# Patient Record
Sex: MALE | Employment: OTHER | URBAN - METROPOLITAN AREA
[De-identification: names, ages, dates, MRNs, and addresses within clinical notes are randomized per-mention and may not be internally consistent; named-entity substitution may affect disease eponyms.]

---

## 2017-11-20 ENCOUNTER — OFFICE VISIT (OUTPATIENT)
Dept: SURGERY | Facility: CLINIC | Age: 52
End: 2017-11-20
Payer: COMMERCIAL

## 2017-11-20 VITALS
TEMPERATURE: 99 F | HEIGHT: 74 IN | DIASTOLIC BLOOD PRESSURE: 81 MMHG | HEART RATE: 68 BPM | WEIGHT: 173 LBS | SYSTOLIC BLOOD PRESSURE: 122 MMHG | BODY MASS INDEX: 22.2 KG/M2

## 2017-11-20 DIAGNOSIS — K40.90 RIGHT INGUINAL HERNIA: Primary | ICD-10-CM

## 2017-11-20 DIAGNOSIS — K40.90 LEFT INGUINAL HERNIA: ICD-10-CM

## 2017-11-20 PROCEDURE — 99203 OFFICE O/P NEW LOW 30 MIN: CPT | Performed by: SURGERY

## 2017-11-20 NOTE — NURSING NOTE
"Chief Complaint   Patient presents with     Consult       Initial /81  Pulse 68  Temp 99  F (37.2  C) (Oral)  Ht 6' 2\" (1.88 m)  Wt 173 lb (78.5 kg)  BMI 22.21 kg/m2 Estimated body mass index is 22.21 kg/(m^2) as calculated from the following:    Height as of this encounter: 6' 2\" (1.88 m).    Weight as of this encounter: 173 lb (78.5 kg).  Medication Reconciliation: complete  "

## 2017-11-21 NOTE — PROGRESS NOTES
HPI:  Kwasi is a 52 year old male who presents for evaluation of right groin bulge.  This is a fairly recent development.  He has had relatively minimal symptoms on that side.  He denies any symptoms on the left side or in the umbilical region.  The patient travels a great deal for work, but does not have to do much lifting.    Past Medical History:  Cataracts  Retinal detachment    Past Surgical History:  Cataracts       Social History:  Social History     Social History     Marital status:      Spouse name: N/A     Number of children: N/A     Years of education: N/A     Occupational History     Not on file.     Social History Main Topics     Smoking status: Not on file     Smokeless tobacco: Not on file     Alcohol use Not on file     Drug use: Not on file     Sexual activity: Not on file     Other Topics Concern     Not on file     Social History Narrative        Family History:  No family history on file.      ROS:  The 10 point review of systems is negative other than noted in the HPI and above.    PE:    General- Well-developed, well-nourished, patient able to get up on table without difficulty.  HEENT- Normocephalic and atraumatic. Pupils equal and round.  Mucous membranes moist.  Sclera are nonicteric.  Neck- No lymphadenopathy or masses   Respirations- are regular and non labored  Abdomen is abdomen is soft without significant tenderness, masses, organomegaly or guarding  Hernia- there is a small left inguinal hernia palpable.  This is easily reducible              Right inguinal hernia is present with valsalva              The hernia is reducible   There is a tiny umbilical hernia of questionable clinical significance.              External genitalia are normal               Assessment: Right inguinal hernia with small left inguinal hernia.    Plan:  I have recommended robotic repair of the patient's right inguinal hernia and probable repair of left inguinal hernia as well.  We would assess this to  be sure that it was a true hernia during the surgery and repair this if appropriate.  We have discussed observation, reduction techniques and importance, incarceration and strangulation signs, symptoms and importance as well as need to seek emergency treatment.    We have discussed surgery in detail, including risk, benefits, complications, mesh, infection, nerve and cord damage and their sequelae including chronic pain and testicular loss, lifting and activity limits after surgery. He has been given literature to review. We will schedule surgery at patient's convenience.      Shaheen Kee MD    Please route or send letter to:  *None*

## 2017-12-04 ENCOUNTER — OFFICE VISIT (OUTPATIENT)
Dept: INTERNAL MEDICINE | Facility: CLINIC | Age: 52
End: 2017-12-04
Payer: COMMERCIAL

## 2017-12-04 VITALS — WEIGHT: 172.3 LBS | HEIGHT: 74 IN | TEMPERATURE: 98.5 F | BODY MASS INDEX: 22.11 KG/M2 | OXYGEN SATURATION: 98 %

## 2017-12-04 DIAGNOSIS — Z23 NEED FOR TDAP VACCINATION: ICD-10-CM

## 2017-12-04 DIAGNOSIS — D72.819 LEUKOPENIA, UNSPECIFIED TYPE: ICD-10-CM

## 2017-12-04 DIAGNOSIS — Z12.5 SCREENING FOR PROSTATE CANCER: ICD-10-CM

## 2017-12-04 DIAGNOSIS — Z00.00 ROUTINE GENERAL MEDICAL EXAMINATION AT A HEALTH CARE FACILITY: Primary | ICD-10-CM

## 2017-12-04 LAB
ALBUMIN SERPL-MCNC: 4.1 G/DL (ref 3.4–5)
ALP SERPL-CCNC: 57 U/L (ref 40–150)
ALT SERPL W P-5'-P-CCNC: 13 U/L (ref 0–70)
ANION GAP SERPL CALCULATED.3IONS-SCNC: 11 MMOL/L (ref 3–14)
AST SERPL W P-5'-P-CCNC: 14 U/L (ref 0–45)
BASOPHILS # BLD AUTO: 0 10E9/L (ref 0–0.2)
BASOPHILS NFR BLD AUTO: 0.2 %
BILIRUB SERPL-MCNC: 0.4 MG/DL (ref 0.2–1.3)
BUN SERPL-MCNC: 19 MG/DL (ref 7–30)
CALCIUM SERPL-MCNC: 9.3 MG/DL (ref 8.5–10.1)
CHLORIDE SERPL-SCNC: 107 MMOL/L (ref 94–109)
CHOLEST SERPL-MCNC: 239 MG/DL
CO2 SERPL-SCNC: 24 MMOL/L (ref 20–32)
CREAT SERPL-MCNC: 1.07 MG/DL (ref 0.66–1.25)
DIFFERENTIAL METHOD BLD: NORMAL
EOSINOPHIL # BLD AUTO: 0.2 10E9/L (ref 0–0.7)
EOSINOPHIL NFR BLD AUTO: 3.8 %
ERYTHROCYTE [DISTWIDTH] IN BLOOD BY AUTOMATED COUNT: 12.2 % (ref 10–15)
GFR SERPL CREATININE-BSD FRML MDRD: 72 ML/MIN/1.7M2
GLUCOSE SERPL-MCNC: 97 MG/DL (ref 70–99)
HCT VFR BLD AUTO: 43.3 % (ref 40–53)
HDLC SERPL-MCNC: 54 MG/DL
HGB BLD-MCNC: 14.4 G/DL (ref 13.3–17.7)
LDLC SERPL CALC-MCNC: 166 MG/DL
LYMPHOCYTES # BLD AUTO: 1.4 10E9/L (ref 0.8–5.3)
LYMPHOCYTES NFR BLD AUTO: 24.8 %
MCH RBC QN AUTO: 31.9 PG (ref 26.5–33)
MCHC RBC AUTO-ENTMCNC: 33.3 G/DL (ref 31.5–36.5)
MCV RBC AUTO: 96 FL (ref 78–100)
MONOCYTES # BLD AUTO: 0.4 10E9/L (ref 0–1.3)
MONOCYTES NFR BLD AUTO: 7.4 %
NEUTROPHILS # BLD AUTO: 3.6 10E9/L (ref 1.6–8.3)
NEUTROPHILS NFR BLD AUTO: 63.8 %
NONHDLC SERPL-MCNC: 185 MG/DL
PLATELET # BLD AUTO: 210 10E9/L (ref 150–450)
POTASSIUM SERPL-SCNC: 4.2 MMOL/L (ref 3.4–5.3)
PROT SERPL-MCNC: 7.4 G/DL (ref 6.8–8.8)
PSA SERPL-ACNC: 0.69 UG/L (ref 0–4)
RBC # BLD AUTO: 4.52 10E12/L (ref 4.4–5.9)
SODIUM SERPL-SCNC: 142 MMOL/L (ref 133–144)
TRIGL SERPL-MCNC: 94 MG/DL
TSH SERPL DL<=0.005 MIU/L-ACNC: 1.28 MU/L (ref 0.4–4)
WBC # BLD AUTO: 5.6 10E9/L (ref 4–11)

## 2017-12-04 PROCEDURE — 99386 PREV VISIT NEW AGE 40-64: CPT | Mod: 25 | Performed by: INTERNAL MEDICINE

## 2017-12-04 PROCEDURE — 80050 GENERAL HEALTH PANEL: CPT | Performed by: INTERNAL MEDICINE

## 2017-12-04 PROCEDURE — 36415 COLL VENOUS BLD VENIPUNCTURE: CPT | Performed by: INTERNAL MEDICINE

## 2017-12-04 PROCEDURE — 80061 LIPID PANEL: CPT | Performed by: INTERNAL MEDICINE

## 2017-12-04 PROCEDURE — G0103 PSA SCREENING: HCPCS | Performed by: INTERNAL MEDICINE

## 2017-12-04 PROCEDURE — 90471 IMMUNIZATION ADMIN: CPT | Performed by: INTERNAL MEDICINE

## 2017-12-04 PROCEDURE — 90715 TDAP VACCINE 7 YRS/> IM: CPT | Performed by: INTERNAL MEDICINE

## 2017-12-04 NOTE — PROGRESS NOTES
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  Comment:   Plan: CBC with platelets differential            (Z12.5) Screening for prostate cancer  Comment:   Plan: PSA, screen               Chief Complaint:                                                      Annual exam    SUBJECTIVE:                                                    History of present illness     Patient reports normal colonoscopy done at age 49       ROS:   General: Negative for fever, chills, major weight changes, fatigue  Skin: Negative for rashes, abnormal spots  Eyes: Negative for blurred or double vision  ENT/mouth: Negative for sinuses discomfort, earache, sore throat  Respiratory: Negative for cough, wheezes, chronic lung disease  Cardiovascular: Negative for rest or exertional chest pain, shortness of breath, palpitations, leg edema,   Gastrointestinal: Negative for vomiting, abdominal pain, heartburn, blood in stool, diarrhea, constipation  Genitourinary: Negative for urinary frequency, blood in urine, history of kidney stones  Male: Negative for difficulty urinating  Neuro: Negative for headaches, numbness, tingling, weakness in arms or legs, history of seizure, recent syncope  Psychiatry: Negative for depression, anxiety, suicidal thoughts  Endo: Negative for known thyroid disease, diabetes.  Hemato/Lymph: Negative for nodes, easy bleeding, history of DVT, blood transfusion  Musculoskeletal: Negative for joint swelling, back pain    Past Medical History:   Diagnosis Date     Kidney calculus 2007     Uveitis 2010        Past  "Medical History:   Diagnosis Date     Kidney calculus 2007     Uveitis 2010      Soc Hx: No daily alcohol, no smoking  Social History     Social History     Marital status:      Spouse name: N/A     Number of children: N/A     Years of education: N/A     Occupational History     Not on file.     Social History Main Topics     Smoking status: Never Smoker     Smokeless tobacco: Never Used     Alcohol use Yes      Comment: occasionally     Drug use: No     Sexual activity: Yes     Partners: Female     Other Topics Concern     Not on file     Social History Narrative     No narrative on file        Fam Hx: reviewed  Family History   Problem Relation Age of Onset     Family History Negative Mother      Family History Negative Father      Family History Negative Maternal Grandmother      DIABETES Maternal Grandfather      Adult onset     Hyperlipidemia Maternal Grandfather      Family History Negative Paternal Grandmother      Family History Negative Paternal Grandfather      Breast Cancer Paternal Aunt 62         Screening: reviewed    All: reviewed    Meds: reviewed  No current outpatient prescriptions on file.           OBJECTIVE:                                                    Physical Exam :      Temperature 98.5  F (36.9  C), temperature source Oral, height 6' 2\" (1.88 m), weight 172 lb 4.8 oz (78.2 kg), SpO2 98 %.   NAD, appears comfortable  Skin clear, no rashes  HEENT: PERRLA, EOMI, anicteric sclera, pink conjunctiva, external ears appear normal, bilateral tympanic membranes clinically normal, oropharynx normal color.  Neck: supple, no JVD,  no thyroidmegaly  Lymph nodes non palpable in the cervical, supraclavicular axillaries, inguinal areas  Chest: clear to auscultation with good respiratory effort  Cardiac: S1S2, RRR, no mgr appreciated  Abdomen: soft, not tender, not distended, audible bowel sound, no hepatosplenomegaly, no palpable masses, no abdominal bruits  Extremities: no cyanosis, clubbing or " edema.   Neuro: A, Ox3, no focal signs.  Breast exam no gynecomastia, no masses  Genital exam performed with the nurse in the room: normal external genitals, no testicular masses. Rectal exam: normal anal sphincter tonus, no masses in the rectal vault, prostate in normal limits.      Sophie Ha MD  Internal Medicine      SUBJECTIVE:   CC: Kwasi Mcelroy is an 52 year old male who presents for preventative health visit.     *Has not been to PCP in 2-3 years since moving from [place]. Only health issues he really has is eye issues  *Was on corticosteriods for about 2011 to 2013 (was on 60-70 mg/day and tapered down).     Healthy Habits:    Do you get at least three servings of calcium containing foods daily (dairy, green leafy vegetables, etc.)? yes    Amount of exercise or daily activities, outside of work: 4-5 day(s) per week    Problems taking medications regularly No    Medication side effects: No    Have you had an eye exam in the past two years? yes    Do you see a dentist twice per year? yes    Do you have sleep apnea, excessive snoring or daytime drowsiness?yes      Today's PHQ-2 Score: No flowsheet data found.      Abuse: Current or Past(Physical, Sexual or Emotional)- No  Do you feel safe in your environment - Yes  Social History   Substance Use Topics     Smoking status: Never Smoker     Smokeless tobacco: Never Used     Alcohol use Yes      Comment: occasionally     The patient does not drink >3 drinks per day nor >7 drinks per week.    Last PSA: No results found for: PSA    Reviewed orders with patient. Reviewed health maintenance and updated orders accordingly - Yes      Reviewed and updated as needed this visit by clinical staff  Tobacco  Allergies  Meds  Med Hx  Surg Hx  Fam Hx  Soc Hx        Reviewed and updated as needed this visit by Provider            COUNSELING:  Reviewed preventive health counseling, as reflected in patient instructions       Regular exercise       Healthy  "diet/nutrition           reports that he has never smoked. He has never used smokeless tobacco.      Estimated body mass index is 22.12 kg/(m^2) as calculated from the following:    Height as of this encounter: 6' 2\" (1.88 m).    Weight as of this encounter: 172 lb 4.8 oz (78.2 kg).         Counseling Resources:  ATP IV Guidelines  Pooled Cohorts Equation Calculator  FRAX Risk Assessment  ICSI Preventive Guidelines  Dietary Guidelines for Americans, 2010  USDA's MyPlate  ASA Prophylaxis  Lung CA Screening    Sophie Adler MD  [facility]  "

## 2017-12-04 NOTE — MR AVS SNAPSHOT
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   o   Discuss preventive care.    o   Review your medicines if your doctor has prescribed any.     Have a cholesterol test every 5 years, or more frequently if you are at risk for high cholesterol/heart disease.     Have a diabetes test (fasting glucose) every three years. If you are at risk for diabetes, you should have this test more often.     Have a colonoscopy at age 50, or have a yearly FIT test (stool test). These exams will check for colon cancer.      Talk with your health care provider about whether or not a prostate cancer screening test (PSA) is right for you.    You should be tested each year for STDs (sexually transmitted diseases), if you re at risk.     Shots: Get a flu shot each year. Get a tetanus shot every 10 years.     Nutrition:    Eat at least 5 servings of fruits and vegetables daily.     Eat whole-grain bread, whole-wheat pasta and brown rice instead of white grains and rice.     Talk to your provider about Calcium and Vitamin D.     Lifestyle    Exercise for at least 150 minutes a week (30 minutes a day, 5 days a week). This will help you control your weight and prevent disease.     Limit alcohol to one drink per day.     No smoking.     Wear sunscreen to prevent skin cancer.     See your dentist every six  "months for an exam and cleaning.     See your eye doctor every 1 to 2 years.            Follow-ups after your visit        Your next 10 appointments already scheduled     Jan 26, 2018   Procedure with Shaheen Kee MD   [facility] (--)    [address]  [facility] [address]   [phone]            Jan 26, 2018 12:00 PM CST   [facility] Same Day Surgery with Shaheen Kee MD, Breonna Ordoñez PA-C   [facility] ([facility])    [facility] ([facility])   [phone]              Who to contact     If you have questions or need follow up information about today's clinic visit or your schedule please contact [facility] directly at [phone].  Normal or non-critical lab and imaging results will be communicated to you by Mister Bellhart, letter or phone within 4 business days after the clinic has received the results. If you do not hear from us within 7 days, please contact the clinic through Mister Bellhart or phone. If you have a critical or abnormal lab result, we will notify you by phone as soon as possible.  Submit refill requests through Perfect or call your pharmacy and they will forward the refill request to us. Please allow 3 business days for your refill to be completed.          Additional Information About Your Visit        Mister BellharTrippeo Information     Perfect lets you send messages to your doctor, view your test results, renew your prescriptions, schedule appointments and more. To sign up, go to [email or web address] . Click on \"Log in\" on the left side of the screen, which will take you to the Welcome page. Then click on \"Sign up Now\" on the right side of the page.     You will be asked to enter the access code listed below, as well as some personal information. Please follow the directions to create your username and password.     Your access code is: " [email or web address]  Expires: 2018  6:18 PM     Your access code will  in 90 days. If you need help or a new code, please call your [place] clinic or [phone].        Care EveryWhere ID     This is your Care EveryWhere ID. This could be used by other organizations to access your [place] medical records  [id number]        Your Vitals Were     Temperature Height Pulse Oximetry BMI (Body Mass Index)          98.5  F (36.9  C) (Oral) 6' 2\" (1.88 m) 98% 22.12 kg/m2         Blood Pressure from Last 3 Encounters:   17 122/81    Weight from Last 3 Encounters:   17 172 lb 4.8 oz (78.2 kg)   17 173 lb (78.5 kg)              We Performed the Following     CBC with platelets differential     Comprehensive metabolic panel     Lipid panel reflex to direct LDL Fasting     PSA, screen     TDAP VACCINE (ADACEL)     TSH with free T4 reflex        Primary Care Provider Fax #    Physician No Ref-Primary [phone]       No address on file        Equal Access to Services     [facility]: Hadii aad ku hadasho Sovandana, waaxda luqadaha, qaybta kaalmada adeegyadewayne, shell roman . So [facility] [phone].    ATENCIÓN: Si habla español, tiene a jarvis disposición servicios gratuitos de asistencia lingüística. Llame al [phone].    We comply with applicable federal civil rights laws and [place] laws. We do not discriminate on the basis of race, color, national origin, age, disability, sex, sexual orientation, or gender identity.            Thank you!     Thank you for choosing [facility]  for your care. Our goal is always to provide you with excellent care. Hearing back from our patients is one way we can continue to improve our services. Please take a few minutes to complete the written survey that you may receive in the mail after your visit with us. Thank you!             Your Updated Medication List - Protect others around you: Learn how to safely use, store and " throw away your medicines at www.disposemymeds.org.      Notice  As of 12/4/2017  4:11 PM    You have not been prescribed any medications.

## 2017-12-04 NOTE — LETTER
[facility]  [address], [address]  [place] [address]  [phone]        December 9, 2017    [facility]elroy  [facility] [facility] [address]            Dear Mr. Kwasi Mcelroy:    The recent blood tests results are in acceptable limits, but high cholesterol.  Diet and exercise should help with the numbers.  Please make a lab appointment for fasting labs in 6 months.  Please make an appointment few days after the labs to discuss about the results and possible meds.   Your risk for stroke and heart disease is 5% over 10 years    Sincerely,    Sophie Ha MD  Internal Medicine    These are some general explanations for tests  WBC means White Blood Cells  Platelets are small blood cells that help with forming the blood clots along with other blood factors.  Electrolytes are Sodium, Potassium, Calcium, Magnesium, Phosphorus.  Liver tests are: AST, ALT, Bilirubin, Alkaline Phosphatase.  Kidney tests are Creatinine, GFR.  HDL Cholesterol - is the good cholesterol and it is good to have it high.  LDL cholesterol is the bad cholesterol and it is good to have it low.  It is recommended to have LDL less than 130 for people with hypertension and to have it less than 100 for people with heart disease, diabetes and chronic kidney disease.  Thyroid tests are TSH, T4, T3  A1c is a test that gives us an idea about how well was controlled the diabetes for the last 3 months.   PSA stands for Prostate Specific Antigen and it can be elevated with prostate cancer or prostate inflammation.        The 10-year ASCVD risk score ([place] Beachdarin CONNOLLY Jr, et al., 2013) is: 5%    Values used to calculate the score:      Age: 52 years      Sex: Male      Is Non- : No      Diabetic: No      Tobacco smoker: No      Systolic Blood Pressure: 122 mmHg      Is BP treated: No      HDL Cholesterol: 54 mg/dL      Total Cholesterol: 239 mg/dL

## 2018-01-18 ENCOUNTER — OFFICE VISIT (OUTPATIENT)
Dept: INTERNAL MEDICINE | Facility: CLINIC | Age: 53
End: 2018-01-18
Payer: COMMERCIAL

## 2018-01-18 VITALS
HEIGHT: 74 IN | DIASTOLIC BLOOD PRESSURE: 70 MMHG | OXYGEN SATURATION: 97 % | SYSTOLIC BLOOD PRESSURE: 122 MMHG | BODY MASS INDEX: 21.94 KG/M2 | TEMPERATURE: 98.3 F | HEART RATE: 79 BPM | WEIGHT: 171 LBS

## 2018-01-18 DIAGNOSIS — E78.00 ELEVATED CHOLESTEROL: ICD-10-CM

## 2018-01-18 DIAGNOSIS — K40.20 BILATERAL INGUINAL HERNIA WITHOUT OBSTRUCTION OR GANGRENE, RECURRENCE NOT SPECIFIED: ICD-10-CM

## 2018-01-18 DIAGNOSIS — Z01.818 PRE-OP EXAM: Primary | ICD-10-CM

## 2018-01-18 PROCEDURE — 99214 OFFICE O/P EST MOD 30 MIN: CPT | Performed by: INTERNAL MEDICINE

## 2018-01-18 PROCEDURE — 93000 ELECTROCARDIOGRAM COMPLETE: CPT | Performed by: INTERNAL MEDICINE

## 2018-01-18 NOTE — PROGRESS NOTES
[place] [address]  [facility] [address]  [phone]  Dept: [phone]    PRE-OP EVALUATION:  Today's date: 2018    Kawsi Mcelroy (: 1965) presents for pre-operative evaluation assessment as requested by Dr. Shaheen Kee.  He requires evaluation and anesthesia risk assessment prior to undergoing surgery/procedure for treatment of bilateral inguinal hernia.  Proposed procedure: Robotic assisted right inguinal hernia repair with mesh possible left inguinal hernia repair with mesh    Date of Surgery/ Procedure: 18  Time of Surgery/ Procedure: 11:40AM  Hospital/Surgical Facility: [facility]  Primary Physician: Sophie Adler  Type of Anesthesia Anticipated: General    Patient has a Health Care Directive or Living Will:  NO    Preop Questions 2018   1.  Do you have a history of heart attack, stroke, stent, bypass or surgery on an artery in the head, neck, heart or legs? No   2.  Do you ever have any pain or discomfort in your chest? No   3.  Do you have a history of  Heart Failure? No   4.   Are you troubled by shortness of breath when:  walking on a level surface, or up a slight hill, or at night? No   5.  Do you currently have a cold, bronchitis or other respiratory infection? No   6.  Do you have a cough, shortness of breath, or wheezing? No   7.  Do you sometimes get pains in the calves of your legs when you walk? No   8. Do you or anyone in your family have previous history of blood clots? No   9.  Do you or does anyone in your family have a serious bleeding problem such as prolonged bleeding following surgeries or cuts? No   10. Have you ever had problems with anemia or been told to take iron pills? No   11. Have you had any abnormal blood loss such as black, tarry or bloody stools? No   12. Have you ever had a blood transfusion? No   13. Have you or any of your relatives ever had problems with anesthesia? No   14. Do  "you have sleep apnea, excessive snoring or daytime drowsiness? No   15. Do you have any prosthetic heart valves? No   16. Do you have prosthetic joints? No       CC:  Preop for multiple medical problems.    HPI:    The patient is scheduled for inguinal  surgery with Dr. Kee  on  23 Jan 2018  No other acute complaints.    Assessment:  1. V72.83H Preop general physical exam _ I do not see any major contraindications for the patient to go through the scheduled surgery.    The proposed surgical procedure is considered  INTERMEDIATE,   surgery risk.    For above listed surgery and anesthesia, Patient is at LOW  risk for surgery/procedure and perioperative/procedure complications.      Cardiovascular risk  Assessment  -- low  -- No further cardiac work up is needed before this surgery.        ECG:    sinus rhythm, no changes suggestive for ischemia    Pulmonary Risk Assessment -- low  -- The patient doesn't have chronic lung disease nor acute respiratory problems     Anemia Assessment :  --  no anemia - patient doesn't need further anemia work up    Blood Sugar Assessment  --  patient doesn't have DM      (K40.20) Bilateral inguinal hernia without obstruction or gangrene, recurrence not specified  Comment:   Plan: Continue same meds, same doses for now        Plan:  --  In the morning of the surgery day you do not take any meds       Physical exam:    Blood pressure 122/70, pulse 79, temperature 98.3  F (36.8  C), temperature source Oral, height 6' 2\" (1.88 m), weight 171 lb (77.6 kg), SpO2 97 %.   NAD, appears comfortable  Skin clear, no rashes  HEENT: PERRLA, EOMI, anicteric sclera, pink conjunctiva, external ears appear normal, bilateral tympanic membranes clinically normal, oropharynx normal color.  Neck: supple, no JVD,  no thyroidmegaly  Lymph nodes non palpable in the cervical, supraclavicular   Chest: clear to auscultation with good respiratory effort  Cardiac: S1S2, RRR, no mgr appreciated  Abdomen: soft, not " tender, not distended, audible bowel sound, no hepatosplenomegaly, no palpable masses, no abdominal bruits  Extremities: no cyanosis, clubbing or edema.   Neuro: A, Ox3, no focal signs.        ROS:   as above     There is no problem list on file for this patient.       Past Medical History:   Diagnosis Date     Kidney calculus 2007     Uveitis 2010      Past Surgical History:   Procedure Laterality Date     CATARACT IOL, RT/LT Bilateral         PSHx: No complications with prior surgeries or anesthesia     Soc Hx: No daily alcohol, no smoking     Family History   Problem Relation Age of Onset     Family History Negative Mother      Family History Negative Father      Family History Negative Maternal Grandmother      DIABETES Maternal Grandfather      Adult onset     Hyperlipidemia Maternal Grandfather      Family History Negative Paternal Grandmother      Family History Negative Paternal Grandfather      Breast Cancer Paternal Aunt 62        All: reviewed    Meds: reviewed  No current outpatient prescriptions on file.          Sophie Ha MD  Internal Medicine       MEDICAL HISTORY:                                                    There are no active problems to display for this patient.     Past Medical History:   Diagnosis Date     Kidney calculus 2007     Uveitis 2010     Past Surgical History:   Procedure Laterality Date     CATARACT IOL, RT/LT Bilateral      No current outpatient prescriptions on file.     OTC products: None, except as noted above    No Known Allergies   Latex Allergy: NO    Social History   Substance Use Topics     Smoking status: Never Smoker     Smokeless tobacco: Never Used     Alcohol use Yes      Comment: occasionally     History   Drug Use No       Recent Labs   Lab Test  12/04/17   0834   HGB  14.4   PLT  210   NA  142   POTASSIUM  4.2   CR  1.07            ICD-10-CM    1. Pre-op exam Z01.818 EKG 12-lead complete w/read - Clinics

## 2018-01-18 NOTE — MR AVS SNAPSHOT
After Visit Summary   1/18/2018    Kwasi Mcelroy    MRN: [id number]           Patient Information     Date Of Birth          1965        Visit Information        Provider Department      1/18/2018 9:00 AM Sophie Adler MD [facility]        Today's Diagnoses     Pre-op exam    -  1    Bilateral inguinal hernia without obstruction or gangrene, recurrence not specified           Follow-ups after your visit        Your next 10 appointments already scheduled     Jan 26, 2018   Procedure with Shaheen Kee MD   [facility] (--)    [address] [facility] [address]   [phone]            Jan 26, 2018 12:00 PM CST   [facility] Same Day Surgery with Shaheen Kee MD, Dave Smith PA-C, Keisha Tilley PA-C   [facility] Surgery [facility] ([facility])    [facility] ([facility])   [phone]              Who to contact     If you have questions or need follow up information about today's clinic visit or your schedule please contact [facility] directly at [phone].  Normal or non-critical lab and imaging results will be communicated to you by MyChart, letter or phone within 4 business days after the clinic has received the results. If you do not hear from us within 7 days, please contact the clinic through MyChart or phone. If you have a critical or abnormal lab result, we will notify you by phone as soon as possible.  Submit refill requests through [employer] or call your pharmacy and they will forward the refill request to us. Please allow 3 business days for your refill to be completed.          Additional Information About Your Visit        MyChart Information     [employer] lets you send messages to your doctor, view your test results, renew your prescriptions, schedule appointments and more. To sign up, go to  [email or web address] . Click on \"Log in\" on the left side of the screen, which will take you to the Welcome page. Then click on \"Sign up Now\" on the right side of the page.     You will be asked to enter the access code listed below, as well as some personal information. Please follow the directions to create your username and password.     Your access code is: [email or web address]  Expires: 2018  6:18 PM     Your access code will  in 90 days. If you need help or a new code, please call your [place] clinic or [phone].        Care EveryWhere ID     This is your Care EveryWhere ID. This could be used by other organizations to access your [place] medical records  [id number]        Your Vitals Were     Pulse Temperature Height Pulse Oximetry BMI (Body Mass Index)       79 98.3  F (36.8  C) (Oral) 6' 2\" (1.88 m) 97% 21.96 kg/m2        Blood Pressure from Last 3 Encounters:   18 122/70   17 122/81    Weight from Last 3 Encounters:   18 171 lb (77.6 kg)   17 172 lb 4.8 oz (78.2 kg)   17 173 lb (78.5 kg)              We Performed the Following     EKG 12-lead complete w/read - Clinics        Primary Care Provider Office Phone # Fax #    Sophie Amanda Adler [phone]       [address] [facility] [address]        Equal Access to Services     [facility]: Hadii lance ku hadasho Soomaali, waaxda luqadaha, qaybta kaalmada adesandra, shell roman . So [facility] [phone].    ATENCIÓN: Si habla español, tiene a jarvis disposición servicios gratuitos de asistencia lingüística. Llame al [phone].    We comply with applicable federal civil rights laws and [place] laws. We do not discriminate on the basis of race, color, national origin, age, disability, sex, sexual orientation, or gender identity.            Thank you!     Thank you for choosing [facility]  for your care. Our goal is always to provide you " with excellent care. Hearing back from our patients is one way we can continue to improve our services. Please take a few minutes to complete the written survey that you may receive in the mail after your visit with us. Thank you!             Your Updated Medication List - Protect others around you: Learn how to safely use, store and throw away your medicines at www.disposemymeds.org.      Notice  As of 1/18/2018  9:34 AM    You have not been prescribed any medications.

## 2018-01-18 NOTE — NURSING NOTE
"Chief Complaint   Patient presents with     Pre-Op Exam       Initial /70 (BP Location: Right arm, Patient Position: Sitting, Cuff Size: Adult Large)  Pulse 79  Temp 98.3  F (36.8  C) (Oral)  Ht 6' 2\" (1.88 m)  Wt 171 lb (77.6 kg)  SpO2 97%  BMI 21.96 kg/m2 Estimated body mass index is 21.96 kg/(m^2) as calculated from the following:    Height as of this encounter: 6' 2\" (1.88 m).    Weight as of this encounter: 171 lb (77.6 kg).  Medication Reconciliation: complete   Camilo AGUIRRE      "

## 2018-01-25 NOTE — H&P (VIEW-ONLY)
[place] [address]  [facility] [address]  [phone]  Dept: [phone]    PRE-OP EVALUATION:  Today's date: 2018    Kwasi Mcelroy (: 1965) presents for pre-operative evaluation assessment as requested by Dr. Shaheen Kee.  He requires evaluation and anesthesia risk assessment prior to undergoing surgery/procedure for treatment of bilateral inguinal hernia.  Proposed procedure: Robotic assisted right inguinal hernia repair with mesh possible left inguinal hernia repair with mesh    Date of Surgery/ Procedure: 18  Time of Surgery/ Procedure: 11:40AM  Hospital/Surgical Facility: [facility]  Primary Physician: Sophie Adler  Type of Anesthesia Anticipated: General    Patient has a Health Care Directive or Living Will:  NO    Preop Questions 2018   1.  Do you have a history of heart attack, stroke, stent, bypass or surgery on an artery in the head, neck, heart or legs? No   2.  Do you ever have any pain or discomfort in your chest? No   3.  Do you have a history of  Heart Failure? No   4.   Are you troubled by shortness of breath when:  walking on a level surface, or up a slight hill, or at night? No   5.  Do you currently have a cold, bronchitis or other respiratory infection? No   6.  Do you have a cough, shortness of breath, or wheezing? No   7.  Do you sometimes get pains in the calves of your legs when you walk? No   8. Do you or anyone in your family have previous history of blood clots? No   9.  Do you or does anyone in your family have a serious bleeding problem such as prolonged bleeding following surgeries or cuts? No   10. Have you ever had problems with anemia or been told to take iron pills? No   11. Have you had any abnormal blood loss such as black, tarry or bloody stools? No   12. Have you ever had a blood transfusion? No   13. Have you or any of your relatives ever had problems with anesthesia? No   14. Do  "you have sleep apnea, excessive snoring or daytime drowsiness? No   15. Do you have any prosthetic heart valves? No   16. Do you have prosthetic joints? No       CC:  Preop for multiple medical problems.    HPI:    The patient is scheduled for inguinal  surgery with Dr. Kee  on  23 Jan 2018  No other acute complaints.    Assessment:  1. V72.83H Preop general physical exam _ I do not see any major contraindications for the patient to go through the scheduled surgery.    The proposed surgical procedure is considered  INTERMEDIATE,   surgery risk.    For above listed surgery and anesthesia, Patient is at LOW  risk for surgery/procedure and perioperative/procedure complications.      Cardiovascular risk  Assessment  -- low  -- No further cardiac work up is needed before this surgery.        ECG:    sinus rhythm, no changes suggestive for ischemia    Pulmonary Risk Assessment -- low  -- The patient doesn't have chronic lung disease nor acute respiratory problems     Anemia Assessment :  --  no anemia - patient doesn't need further anemia work up    Blood Sugar Assessment  --  patient doesn't have DM      (K40.20) Bilateral inguinal hernia without obstruction or gangrene, recurrence not specified  Comment:   Plan: Continue same meds, same doses for now        Plan:  --  In the morning of the surgery day you do not take any meds       Physical exam:    Blood pressure 122/70, pulse 79, temperature 98.3  F (36.8  C), temperature source Oral, height 6' 2\" (1.88 m), weight 171 lb (77.6 kg), SpO2 97 %.   NAD, appears comfortable  Skin clear, no rashes  HEENT: PERRLA, EOMI, anicteric sclera, pink conjunctiva, external ears appear normal, bilateral tympanic membranes clinically normal, oropharynx normal color.  Neck: supple, no JVD,  no thyroidmegaly  Lymph nodes non palpable in the cervical, supraclavicular   Chest: clear to auscultation with good respiratory effort  Cardiac: S1S2, RRR, no mgr appreciated  Abdomen: soft, not " tender, not distended, audible bowel sound, no hepatosplenomegaly, no palpable masses, no abdominal bruits  Extremities: no cyanosis, clubbing or edema.   Neuro: A, Ox3, no focal signs.        ROS:   as above     There is no problem list on file for this patient.       Past Medical History:   Diagnosis Date     Kidney calculus 2007     Uveitis 2010      Past Surgical History:   Procedure Laterality Date     CATARACT IOL, RT/LT Bilateral         PSHx: No complications with prior surgeries or anesthesia     Soc Hx: No daily alcohol, no smoking     Family History   Problem Relation Age of Onset     Family History Negative Mother      Family History Negative Father      Family History Negative Maternal Grandmother      DIABETES Maternal Grandfather      Adult onset     Hyperlipidemia Maternal Grandfather      Family History Negative Paternal Grandmother      Family History Negative Paternal Grandfather      Breast Cancer Paternal Aunt 62        All: reviewed    Meds: reviewed  No current outpatient prescriptions on file.          Sophie Ha MD  Internal Medicine       MEDICAL HISTORY:                                                    There are no active problems to display for this patient.     Past Medical History:   Diagnosis Date     Kidney calculus 2007     Uveitis 2010     Past Surgical History:   Procedure Laterality Date     CATARACT IOL, RT/LT Bilateral      No current outpatient prescriptions on file.     OTC products: None, except as noted above    No Known Allergies   Latex Allergy: NO    Social History   Substance Use Topics     Smoking status: Never Smoker     Smokeless tobacco: Never Used     Alcohol use Yes      Comment: occasionally     History   Drug Use No       Recent Labs   Lab Test  12/04/17   0834   HGB  14.4   PLT  210   NA  142   POTASSIUM  4.2   CR  1.07            ICD-10-CM    1. Pre-op exam Z01.818 EKG 12-lead complete w/read - Clinics

## 2018-01-26 ENCOUNTER — ANESTHESIA (OUTPATIENT)
Dept: SURGERY | Facility: CLINIC | Age: 53
End: 2018-01-26
Payer: COMMERCIAL

## 2018-01-26 ENCOUNTER — ANESTHESIA EVENT (OUTPATIENT)
Dept: SURGERY | Facility: CLINIC | Age: 53
End: 2018-01-26
Payer: COMMERCIAL

## 2018-01-26 ENCOUNTER — APPOINTMENT (OUTPATIENT)
Dept: SURGERY | Facility: PHYSICIAN GROUP | Age: 53
End: 2018-01-26
Payer: COMMERCIAL

## 2018-01-26 ENCOUNTER — SURGERY (OUTPATIENT)
Age: 53
End: 2018-01-26

## 2018-01-26 ENCOUNTER — HOSPITAL ENCOUNTER (OUTPATIENT)
Facility: CLINIC | Age: 53
Discharge: HOME | End: 2018-01-26
Attending: SURGERY | Admitting: SURGERY
Payer: COMMERCIAL

## 2018-01-26 VITALS
DIASTOLIC BLOOD PRESSURE: 84 MMHG | SYSTOLIC BLOOD PRESSURE: 116 MMHG | RESPIRATION RATE: 16 BRPM | BODY MASS INDEX: 21.43 KG/M2 | TEMPERATURE: 98.4 F | OXYGEN SATURATION: 98 % | WEIGHT: 167 LBS | HEIGHT: 74 IN

## 2018-01-26 DIAGNOSIS — K40.90 RIGHT INGUINAL HERNIA: Primary | ICD-10-CM

## 2018-01-26 PROCEDURE — 25000128 H RX IP 250 OP 636: Performed by: ANESTHESIOLOGY

## 2018-01-26 PROCEDURE — 37000009 ZZH ANESTHESIA TECHNICAL FEE, EACH ADDTL 15 MIN: Performed by: SURGERY

## 2018-01-26 PROCEDURE — 27210794 ZZH OR GENERAL SUPPLY STERILE: Performed by: SURGERY

## 2018-01-26 PROCEDURE — C1781 MESH (IMPLANTABLE): HCPCS | Performed by: SURGERY

## 2018-01-26 PROCEDURE — 71000013 ZZH RECOVERY PHASE 1 LEVEL 1 EA ADDTL HR: Performed by: SURGERY

## 2018-01-26 PROCEDURE — 25000128 H RX IP 250 OP 636: Performed by: NURSE ANESTHETIST, CERTIFIED REGISTERED

## 2018-01-26 PROCEDURE — 25000125 ZZHC RX 250: Performed by: NURSE ANESTHETIST, CERTIFIED REGISTERED

## 2018-01-26 PROCEDURE — 25000125 ZZHC RX 250: Performed by: SURGERY

## 2018-01-26 PROCEDURE — 25000125 ZZHC RX 250: Performed by: ANESTHESIOLOGY

## 2018-01-26 PROCEDURE — 40000306 ZZH STATISTIC PRE PROC ASSESS II: Performed by: SURGERY

## 2018-01-26 PROCEDURE — 49650 LAP ING HERNIA REPAIR INIT: CPT | Mod: AS | Performed by: PHYSICIAN ASSISTANT

## 2018-01-26 PROCEDURE — 36000087 ZZH SURGERY LEVEL 8 EA 15 ADDTL MIN: Performed by: SURGERY

## 2018-01-26 PROCEDURE — 25000132 ZZH RX MED GY IP 250 OP 250 PS 637: Performed by: SURGERY

## 2018-01-26 PROCEDURE — 25000128 H RX IP 250 OP 636: Performed by: SURGERY

## 2018-01-26 PROCEDURE — 25000566 ZZH SEVOFLURANE, EA 15 MIN: Performed by: SURGERY

## 2018-01-26 PROCEDURE — 36000085 ZZH SURGERY LEVEL 8 1ST 30 MIN: Performed by: SURGERY

## 2018-01-26 PROCEDURE — 37000008 ZZH ANESTHESIA TECHNICAL FEE, 1ST 30 MIN: Performed by: SURGERY

## 2018-01-26 PROCEDURE — 71000027 ZZH RECOVERY PHASE 2 EACH 15 MINS: Performed by: SURGERY

## 2018-01-26 PROCEDURE — 49650 LAP ING HERNIA REPAIR INIT: CPT | Mod: RT | Performed by: SURGERY

## 2018-01-26 PROCEDURE — 71000012 ZZH RECOVERY PHASE 1 LEVEL 1 FIRST HR: Performed by: SURGERY

## 2018-01-26 DEVICE — MESH PROGRIP LAPAROSCOPIC 5.9X3.9" PARIETEX SELF-FIX LPG1510: Type: IMPLANTABLE DEVICE | Site: INGUINAL | Status: FUNCTIONAL

## 2018-01-26 RX ORDER — MEPERIDINE HYDROCHLORIDE 25 MG/ML
12.5 INJECTION INTRAMUSCULAR; INTRAVENOUS; SUBCUTANEOUS
Status: DISCONTINUED | OUTPATIENT
Start: 2018-01-26 | End: 2018-01-26 | Stop reason: HOSPADM

## 2018-01-26 RX ORDER — SODIUM CHLORIDE, SODIUM LACTATE, POTASSIUM CHLORIDE, CALCIUM CHLORIDE 600; 310; 30; 20 MG/100ML; MG/100ML; MG/100ML; MG/100ML
INJECTION, SOLUTION INTRAVENOUS CONTINUOUS
Status: DISCONTINUED | OUTPATIENT
Start: 2018-01-26 | End: 2018-01-26 | Stop reason: HOSPADM

## 2018-01-26 RX ORDER — PROPOFOL 10 MG/ML
INJECTION, EMULSION INTRAVENOUS PRN
Status: DISCONTINUED | OUTPATIENT
Start: 2018-01-26 | End: 2018-01-26

## 2018-01-26 RX ORDER — KETOROLAC TROMETHAMINE 30 MG/ML
INJECTION, SOLUTION INTRAMUSCULAR; INTRAVENOUS PRN
Status: DISCONTINUED | OUTPATIENT
Start: 2018-01-26 | End: 2018-01-26

## 2018-01-26 RX ORDER — DEXAMETHASONE SODIUM PHOSPHATE 4 MG/ML
INJECTION, SOLUTION INTRA-ARTICULAR; INTRALESIONAL; INTRAMUSCULAR; INTRAVENOUS; SOFT TISSUE PRN
Status: DISCONTINUED | OUTPATIENT
Start: 2018-01-26 | End: 2018-01-26

## 2018-01-26 RX ORDER — FENTANYL CITRATE 50 UG/ML
25-50 INJECTION, SOLUTION INTRAMUSCULAR; INTRAVENOUS
Status: DISCONTINUED | OUTPATIENT
Start: 2018-01-26 | End: 2018-01-26 | Stop reason: HOSPADM

## 2018-01-26 RX ORDER — OXYCODONE HYDROCHLORIDE 5 MG/1
5-10 TABLET ORAL
Qty: 30 TABLET | Refills: 0 | Status: SHIPPED | OUTPATIENT
Start: 2018-01-26 | End: 2018-05-14

## 2018-01-26 RX ORDER — LIDOCAINE 40 MG/G
CREAM TOPICAL
Status: DISCONTINUED | OUTPATIENT
Start: 2018-01-26 | End: 2018-01-26 | Stop reason: HOSPADM

## 2018-01-26 RX ORDER — ONDANSETRON 4 MG/1
4 TABLET, ORALLY DISINTEGRATING ORAL EVERY 30 MIN PRN
Status: DISCONTINUED | OUTPATIENT
Start: 2018-01-26 | End: 2018-01-26 | Stop reason: HOSPADM

## 2018-01-26 RX ORDER — CEFAZOLIN SODIUM 2 G/100ML
2 INJECTION, SOLUTION INTRAVENOUS
Status: COMPLETED | OUTPATIENT
Start: 2018-01-26 | End: 2018-01-26

## 2018-01-26 RX ORDER — OXYCODONE HYDROCHLORIDE 5 MG/1
5 TABLET ORAL
Status: DISCONTINUED | OUTPATIENT
Start: 2018-01-26 | End: 2018-01-26 | Stop reason: HOSPADM

## 2018-01-26 RX ORDER — FENTANYL CITRATE 50 UG/ML
INJECTION, SOLUTION INTRAMUSCULAR; INTRAVENOUS PRN
Status: DISCONTINUED | OUTPATIENT
Start: 2018-01-26 | End: 2018-01-26

## 2018-01-26 RX ORDER — LABETALOL HYDROCHLORIDE 5 MG/ML
10 INJECTION, SOLUTION INTRAVENOUS
Status: DISCONTINUED | OUTPATIENT
Start: 2018-01-26 | End: 2018-01-26 | Stop reason: HOSPADM

## 2018-01-26 RX ORDER — ONDANSETRON 2 MG/ML
4 INJECTION INTRAMUSCULAR; INTRAVENOUS EVERY 30 MIN PRN
Status: DISCONTINUED | OUTPATIENT
Start: 2018-01-26 | End: 2018-01-26 | Stop reason: HOSPADM

## 2018-01-26 RX ORDER — NALOXONE HYDROCHLORIDE 0.4 MG/ML
.1-.4 INJECTION, SOLUTION INTRAMUSCULAR; INTRAVENOUS; SUBCUTANEOUS
Status: DISCONTINUED | OUTPATIENT
Start: 2018-01-26 | End: 2018-01-26 | Stop reason: HOSPADM

## 2018-01-26 RX ORDER — LIDOCAINE HYDROCHLORIDE 10 MG/ML
INJECTION, SOLUTION INFILTRATION; PERINEURAL PRN
Status: DISCONTINUED | OUTPATIENT
Start: 2018-01-26 | End: 2018-01-26

## 2018-01-26 RX ORDER — ONDANSETRON 2 MG/ML
INJECTION INTRAMUSCULAR; INTRAVENOUS PRN
Status: DISCONTINUED | OUTPATIENT
Start: 2018-01-26 | End: 2018-01-26

## 2018-01-26 RX ORDER — BUPIVACAINE HYDROCHLORIDE AND EPINEPHRINE 5; 5 MG/ML; UG/ML
INJECTION, SOLUTION EPIDURAL; INTRACAUDAL; PERINEURAL PRN
Status: DISCONTINUED | OUTPATIENT
Start: 2018-01-26 | End: 2018-01-26 | Stop reason: HOSPADM

## 2018-01-26 RX ORDER — CEFAZOLIN SODIUM 1 G/3ML
1 INJECTION, POWDER, FOR SOLUTION INTRAMUSCULAR; INTRAVENOUS SEE ADMIN INSTRUCTIONS
Status: DISCONTINUED | OUTPATIENT
Start: 2018-01-26 | End: 2018-01-26 | Stop reason: HOSPADM

## 2018-01-26 RX ORDER — HYDROCODONE BITARTRATE AND ACETAMINOPHEN 5; 325 MG/1; MG/1
1 TABLET ORAL EVERY 6 HOURS PRN
Status: DISCONTINUED | OUTPATIENT
Start: 2018-01-26 | End: 2018-01-26 | Stop reason: HOSPADM

## 2018-01-26 RX ORDER — PROPOFOL 10 MG/ML
INJECTION, EMULSION INTRAVENOUS CONTINUOUS PRN
Status: DISCONTINUED | OUTPATIENT
Start: 2018-01-26 | End: 2018-01-26

## 2018-01-26 RX ADMIN — SODIUM CHLORIDE, POTASSIUM CHLORIDE, SODIUM LACTATE AND CALCIUM CHLORIDE: 600; 310; 30; 20 INJECTION, SOLUTION INTRAVENOUS at 11:47

## 2018-01-26 RX ADMIN — PROPOFOL 75 MCG/KG/MIN: 10 INJECTION, EMULSION INTRAVENOUS at 12:00

## 2018-01-26 RX ADMIN — LIDOCAINE HYDROCHLORIDE 30 MG: 10 INJECTION, SOLUTION INFILTRATION; PERINEURAL at 11:53

## 2018-01-26 RX ADMIN — SODIUM CHLORIDE, POTASSIUM CHLORIDE, SODIUM LACTATE AND CALCIUM CHLORIDE: 600; 310; 30; 20 INJECTION, SOLUTION INTRAVENOUS at 12:55

## 2018-01-26 RX ADMIN — BUPIVACAINE HYDROCHLORIDE AND EPINEPHRINE BITARTRATE 30 ML: 5; .005 INJECTION, SOLUTION EPIDURAL; INTRACAUDAL; PERINEURAL at 13:36

## 2018-01-26 RX ADMIN — PROPOFOL 160 MG: 10 INJECTION, EMULSION INTRAVENOUS at 11:53

## 2018-01-26 RX ADMIN — ROCURONIUM BROMIDE 10 MG: 10 INJECTION INTRAVENOUS at 12:35

## 2018-01-26 RX ADMIN — ROCURONIUM BROMIDE 40 MG: 10 INJECTION INTRAVENOUS at 11:53

## 2018-01-26 RX ADMIN — MIDAZOLAM 2 MG: 1 INJECTION INTRAMUSCULAR; INTRAVENOUS at 11:47

## 2018-01-26 RX ADMIN — CEFAZOLIN SODIUM 2 G: 2 INJECTION, SOLUTION INTRAVENOUS at 11:47

## 2018-01-26 RX ADMIN — FENTANYL CITRATE 100 MCG: 50 INJECTION, SOLUTION INTRAMUSCULAR; INTRAVENOUS at 11:53

## 2018-01-26 RX ADMIN — DEXAMETHASONE SODIUM PHOSPHATE 4 MG: 4 INJECTION, SOLUTION INTRA-ARTICULAR; INTRALESIONAL; INTRAMUSCULAR; INTRAVENOUS; SOFT TISSUE at 11:53

## 2018-01-26 RX ADMIN — HYDROCODONE BITARTRATE AND ACETAMINOPHEN 1 TABLET: 5; 325 TABLET ORAL at 15:40

## 2018-01-26 RX ADMIN — Medication 0.5 MG: at 14:21

## 2018-01-26 RX ADMIN — KETOROLAC TROMETHAMINE 30 MG: 30 INJECTION, SOLUTION INTRAMUSCULAR at 13:28

## 2018-01-26 RX ADMIN — FENTANYL CITRATE 50 MCG: 50 INJECTION, SOLUTION INTRAMUSCULAR; INTRAVENOUS at 12:25

## 2018-01-26 RX ADMIN — ONDANSETRON 4 MG: 2 INJECTION INTRAMUSCULAR; INTRAVENOUS at 13:26

## 2018-01-26 NOTE — IP AVS SNAPSHOT
[facility] PreOP/PostOP    [address]    [facility] [address]    Phone:  [phone]    Fax:  [phone]                                       After Visit Summary   1/26/2018    Kwasi Mcelroy    MRN: [id number]           After Visit Summary Signature Page     I have received my discharge instructions, and my questions have been answered. I have discussed any challenges I see with this plan with the nurse or doctor.    ..........................................................................................................................................  Patient/Patient Representative Signature      ..........................................................................................................................................  Patient Representative Print Name and Relationship to Patient    ..................................................               ................................................  Date                                            Time    ..........................................................................................................................................  Reviewed by Signature/Title    ...................................................              ..............................................  Date                                                            Time

## 2018-01-26 NOTE — ANESTHESIA CARE TRANSFER NOTE
Patient: Kwasi Mcelroy    Procedure(s):  Robotic assisted right inguinal and femoral hernia repair with mesh  - Wound Class: I-Clean    Diagnosis: right inguinal hernia possible left  Diagnosis Additional Information: No value filed.    Anesthesia Type:   General, ETT     Note:  Airway :Face Mask  Patient transferred to:PACU  Comments: To PACU, Awake, VSS, SV, O2, Report to RNHandoff Report: Identifed the Patient, Identified the Reponsible Provider, Reviewed the pertinent medical history, Discussed the surgical course, Reviewed Intra-OP anesthesia mangement and issues during anesthesia, Set expectations for post-procedure period and Allowed opportunity for questions and acknowledgement of understanding      Vitals: (Last set prior to Anesthesia Care Transfer)    CRNA VITALS  1/26/2018 1317 - 1/26/2018 1354      1/26/2018             Pulse: 87    SpO2: 100 %    Resp Rate (observed): (!)  7                Electronically Signed By: Dean Dennis Severson, APRN CRNA  January 26, 2018  1:54 PM

## 2018-01-26 NOTE — IP AVS SNAPSHOT
MRN:[id number]                      After Visit Summary   1/26/2018    Kwasi Mcelroy    MRN: [id number]           Thank you!     Thank you for choosing [facility] for your care. Our goal is always to provide you with excellent care. Hearing back from our patients is one way we can continue to improve our services. Please take a few minutes to complete the written survey that you may receive in the mail after you visit. If you would like to speak to someone directly about your visit please contact Patient Relations at [phone]. Thank you!          Patient Information     Date Of Birth          1965        About your hospital stay     You were admitted on:  January 26, 2018 You last received care in the:  [facility] PreOP/PostOP    You were discharged on:  January 26, 2018       Who to Call     For medical emergencies, please call 911.  For non-urgent questions about your medical care, please call your primary care provider or clinic, [phone]  For questions related to your surgery, please call your surgery clinic        Attending Provider     Provider Specialty    Shaheen Kee MD General Surgery       Primary Care Provider Office Phone # Fax #    Sophie Amanda Adler [phone]      Further instructions from your care team             HOME CARE FOLLOWING HERNIA REPAIR  JESI Pak, NUNO Madera, JULIUS Farr, JESI Pearce, JOHNSON Walls & Chapo    DIET:  No restrictions. Increased fluid intake is recommended. While taking pain medications, increase dietary fiber or add a fiber supplementation like Metamucil or Citrucel to help prevent constipation - a possible side effect of pain medications.  If taking Metamucil or Citrucel, take with plenty of fluids as instructed.    NAUSEA:  If nauseated from the anesthetic/pain meds; rest in bed, get up cautiously with assistance, and drink clear liquids (juice, tea,  reinier).    ACTIVITY:  Light Activity -- you may immediately be up and about as tolerated.  Driving -- you may drive when comfortable and off narcotic pain medications.  Light Work -- resume when comfortable off pain medications.  (If you can drive, you probably can work.)  Strenuous Work/Activity -- limit lifting to 20 pounds for 3 weeks.  Active Sports (running, biking, etc.) -- cautiously resume after 3 weeks.    INCISIONAL CARE:    If you have a dressing in place, keep clean and dry for 48 hours; you may replace the gauze if it becomes soiled.    After 48 hours you may remove the dressing and shower.  Do not submerse incision in water for 1 week.    If you have a Dermabond dressing (a type of skin glue), you may shower immediately.    Sutures will absorb and need not be removed.    If present, leave the steri-strips (white paper tapes) in place until they fall off.    If present, leave Dermabond glue in place until it wears/flakes off.    Expect a variable amount of swelling/black and blue discoloration that may involve the penis/scrotum or labia.    Some numbness around the incision is common.    A lump/ridge under the incision is normal and will gradually resolve.    DISCOMFORT:  Local anesthetic placed at surgery should provide relief for 4-8 hours.  Begin taking pain pills before discomfort is severe.  Take the pain medication with some food, when possible, to minimize side effects.  Intermittent use of ice packs to the hernia repair site may help during the first 48 hours.  Expect gradual improvement.    RETURN APPOINTMENT:  Schedule a follow-up visit 1-3 weeks post-op (you may do this any time after surgery is scheduled).  Office Phone:  [phone]    CONTACT US IF THE FOLLOWING DEVELOPS:  1.  A fever that is above 101    2.  If there is a large amount of drainage, bleeding, or swelling.  3.  Severe pain that is not relieved by your prescription.  4.  Drainage that is thick, cloudy, yellow, green or  white.  5.  Any other questions not answered by  Frequently Asked Questions  sheet.              FREQUENTLY ASKED QUESTIONS AFTER SURGERY  Win Lam, JESI Kee, NUNO Madera, JULIUS Farr, JESI Pearce, JOHNSON Walls  &  JOSE LUIS Goetz      Q:  How should my incision look?    A:  Normally your incision will appear slightly swollen with light redness directly along the incision itself as it heals.  It may feel like a bump or ridge as the healing/scarring happens, and over time (3-4 months) this bump or ridge feeling should slowly go away.  In general, clear or pink watery drainage can be normal at first as your incision heals, but should decrease over time.    Q:  How do I know if my incision is infected?  A:  Look at your incision for signs of infection, like redness around the incision spreading to surrounding skin, or drainage of cloudy or foul-smelling drainage.  If you feel warm, check your temperature to see if you are running a fever.    **If any of these things occur, please notify the nurse at our office.  We may need you to come into the office for an incision check.      Q:  How do I take care of my incision?  A:  If you have a dressing in place - Starting the day after surgery, replace the dressing 1-2 times a day until there is no further drainage from the incision.  At that time, a dressing is no longer needed.  Try to minimize tape on the skin if irritation is occurring at the tape sites.  If you have significant irritation from tape on the skin, please call the office to discuss other method of dressing your incision.    Small pieces of tape called  steri-strips  may be present directly overlying your incision; these may be removed 10 days after surgery unless otherwise specified by your surgeon.  If these tapes start to loosen at the ends, you may trim them back until they fall off or are removed.    A:  If you had  Dermabond  tissue glue used as a dressing (this causes your incision to look shiny with a  clear covering over it) - This type of dressing wears off with time and does not require more dressings over the top unless it is draining around the glue as it wears off.  Do not apply ointments or lotions over the incisions until the glue has completely worn off.    Q:  There is a piece of tape or a sticky  lead  still on my skin.  Can I remove this?  A:  Sometimes the sticky  leads  used for monitoring during surgery or for evaluation in the emergency department are not all removed while you are in the hospital.  These sometimes have a tab or metal dot on them.  You can easily remove these on your own, like taking off a band-aid.  If there is a gel substance under the  lead , simply wipe/clean it off with a washcloth or paper towel.      Q:  What can I do to minimize constipation (very hard stools, or lack of stools)?  A:  Stay well hydrated.  Increase your dietary fiber intake or take a fiber supplement -with plenty of water.  Walk around frequently.  You may consider an over-the-counter stool-softener.  Your Pharmacist can assist you with choosing one that is stocked at your pharmacy.  Constipation is also one of the most common side effects of pain medication.  If you are using pain medication, be pro-active and try to PREVENT problems with constipation by taking the steps above BEFORE constipation becomes a problem.    Q:  What do I do if I need more pain medications?  A:  Call the office to receive refills.  Be aware that certain pain meds cannot be called into a pharmacy and actually require a paper prescription.  A change may be made in your pain med as you progress thru your recovery period or if you have side effects to certain meds.    --Pain meds are NOT refilled after 5pm on weekdays, and NOT AT ALL on the weekends, so please look ahead to prevent problems.      Q:  Why am I having a hard time sleeping now that I am at home?  A:  Many medications you receive while you are in the hospital can impact  your sleep for a number of days after your surgery/hospitalization.  Decreased level of activity and naps during the day may also make sleeping at night difficult.  Try to minimize day-time naps, and get up frequently during the day to walk around your home during your recovery time.  Sleep aides may be of some help, but are not recommended for long-term use.      Q:  I am having some back discomfort.  What should I do?  A:  This may be related to certain positioning that was required for your surgery, extended periods of time in bed, or other changes in your overall activity level.  You may try ice, heat, acetaminophen, or ibuprofen to treat this temporarily.  Note that many pain medications have acetaminophen in them and would state this on the prescription bottle.  Be sure not to exceed the maximum of 4000mg per day of acetaminophen.     **If the pain you are having does not resolve, is severe, or is a flare of back pain you have had on other occasions prior to surgery, please contact your primary physician for further recommendations or for an appointment to be examined at their office.    Q:  Why am I having headaches?  A:  Headaches can be caused by many things:  caffeine withdrawal, use of pain meds, dehydration, high blood pressure, lack of sleep, over-activity/exhaustion, flare-up of usual migraine headaches.  If you feel this is related to muscle tension (a band-like feeling around the head, or a pressure at the low-back of the head) you may try ice or heat to this area.  You may need to drink more fluids (try electrolyte drink like Gatorade), rest, or take your usual migraine medications.   **If your headaches do not resolve, worsen, are accompanied by other symptoms, or if your blood pressure is high, please call your primary physician for recommendation and/or examination.    Q:  I am unable to urinate.  What do I do?  A:  A small percentage of people can have difficulty urinating initially after  surgery.  This includes being able to urinate only a very small amount at a time and feeling discomfort or pressure in the very low abdomen.  This is called  urinary retention , and is actually an urgent situation.  Proceed to your nearest Emergency department for evaluation (not an Urgent Care Center).  Sometimes the bladder does not work correctly after certain medications you receive during surgery, or related to certain procedures.  You may need to have a catheter placed until your bladder recovers.  When planning to go to an Emergency department, it may help to call the ER to let them know you are coming in for this problem after a surgery.  This may help you get in quicker to be evaluated.  **If you have symptoms of a urinary tract infection, please contact your primary physician for the proper evaluation and treatment.    If you have other questions, please call the office Monday thru Friday between 8am and 5pm to discuss with the nurse or physician assistant.  #[phone]    There is a surgeon ON CALL on weekday evenings and over the weekend in case of urgent need only, and may be contacted at the same number.    If you are having an emergency, call 911 or proceed to your nearest emergency department.            GENERAL ANESTHESIA OR SEDATION ADULT DISCHARGE INSTRUCTIONS   SPECIAL PRECAUTIONS FOR 24 HOURS AFTER SURGERY    IT IS NOT UNUSUAL TO FEEL LIGHT-HEADED OR FAINT, UP TO 24 HOURS AFTER SURGERY OR WHILE TAKING PAIN MEDICATION.  IF YOU HAVE THESE SYMPTOMS; SIT FOR A FEW MINUTES BEFORE STANDING AND HAVE SOMEONE ASSIST YOU WHEN YOU GET UP TO WALK OR USE THE BATHROOM.    YOU SHOULD REST AND RELAX FOR THE NEXT 24 HOURS AND YOU MUST MAKE ARRANGEMENTS TO HAVE SOMEONE STAY WITH YOU FOR AT LEAST 24 HOURS AFTER YOUR DISCHARGE.  AVOID HAZARDOUS AND STRENUOUS ACTIVITIES.  DO NOT MAKE IMPORTANT DECISIONS FOR 24 HOURS.    DO NOT DRIVE ANY VEHICLE OR OPERATE MECHANICAL EQUIPMENT FOR 24 HOURS FOLLOWING THE END OF  "YOUR SURGERY.  EVEN THOUGH YOU MAY FEEL NORMAL, YOUR REACTIONS MAY BE AFFECTED BY THE MEDICATION YOU HAVE RECEIVED.    DO NOT DRINK ALCOHOLIC BEVERAGES FOR 24 HOURS FOLLOWING YOUR SURGERY.    DRINK CLEAR LIQUIDS (APPLE JUICE, GINGER ALE, 7-UP, BROTH, ETC.).  PROGRESS TO YOUR REGULAR DIET AS YOU FEEL ABLE.    YOU MAY HAVE A DRY MOUTH, A SORE THROAT, MUSCLES ACHES OR TROUBLE SLEEPING.  THESE SHOULD GO AWAY AFTER 24 HOURS.    CALL YOUR DOCTOR FOR ANY OF THE FOLLOWING:  SIGNS OF INFECTION (FEVER, GROWING TENDERNESS AT THE SURGERY SITE, A LARGE AMOUNT OF DRAINAGE OR BLEEDING, SEVERE PAIN, FOUL-SMELLING DRAINAGE, REDNESS OR SWELLING.    IT HAS BEEN OVER 8 TO 10 HOURS SINCE SURGERY AND YOU ARE STILL NOT ABLE TO URINATE (PASS WATER).           You received Toradol, an IV form of ibuprofen (Motrin) at 1:30PM.  Do not take any ibuprofen products until 7:30PM.  You had one oxyCODONE IR 5 MG tablet at 3:35 PM.        Pending Results     No orders found from 1/24/2018 to 1/27/2018.            Admission Information     Date & Time Provider Department Dept. Phone    1/26/2018 Shaheen Kee MD [facility] PreOP/PostOP [phone]      Your Vitals Were     Blood Pressure Temperature Respirations Height Weight Pulse Oximetry    125/85 (Cuff Size: Adult Large) 98.6  F (37  C) (Temporal) 14 1.88 m (6' 2.02\") 75.8 kg (167 lb) 97%    BMI (Body Mass Index)                   21.43 kg/m2           [employer] Information     [employer] lets you send messages to your doctor, view your test results, renew your prescriptions, schedule appointments and more. To sign up, go to [email or web address] . Click on \"Log in\" on the left side of the screen, which will take you to the Welcome page. Then click on \"Sign up Now\" on the right side of the page.     You will be asked to enter the access code listed below, as well as some personal information. Please follow the directions to create your username and password.     Your access code is: " [email or web address]  Expires: 2018  6:18 PM     Your access code will  in 90 days. If you need help or a new code, please call your [place] clinic or [phone].        Care EveryWhere ID     This is your Care EveryWhere ID. This could be used by other organizations to access your [place] medical records  [id number]        Equal Access to Services     [facility], Memorial CampusANTHONY : Hadii aad ku hadasho Soomaali, waaxda luqadaha, qaybta kaalmada adeegyada, waxay idiin hayaan adesabrina garcia laMagoshaun . So [facility] [phone].    ATENCIÓN: Si habla be, tiene a jarvis disposición servicios gratuitos de asistencia lingüística. Cathi al [phone].    We comply with applicable federal civil rights laws and [place] laws. We do not discriminate on the basis of race, color, national origin, age, disability, sex, sexual orientation, or gender identity.               Review of your medicines      START taking        Dose / Directions    oxyCODONE IR 5 MG tablet   Commonly known as:  ROXICODONE   Used for:  Right inguinal hernia        Dose:  5-10 mg   Take 1-2 tablets (5-10 mg) by mouth every 3 hours as needed for pain or other (Moderate to Severe)   Quantity:  30 tablet   Refills:  0            Where to get your medicines      Some of these will need a paper prescription and others can be bought over the counter. Ask your nurse if you have questions.     Bring a paper prescription for each of these medications     oxyCODONE IR 5 MG tablet                Protect others around you: Learn how to safely use, store and throw away your medicines at www.disposemymeds.org.        Information about OPIOIDS     PRESCRIPTION OPIOIDS: WHAT YOU NEED TO KNOW    Prescription opioids can be used to help relieve moderate to severe pain and are often prescribed following a surgery or injury, or for certain health conditions. These medications can be an important part of treatment but also come with serious risks. It is important to work with your  health care provider to make sure you are getting the safest, most effective care.    WHAT ARE THE RISKS AND SIDE EFFECTS OF OPIOID USE?  Prescription opioids carry serious risks of addiction and overdose, especially with prolonged use. An opioid overdose, often marked by slowed breathing can cause sudden death. The use of prescription opioids can have a number of side effects as well, even when taken as directed:      Tolerance - meaning you might need to take more of a medication for the same pain relief    Physical dependence - meaning you have symptoms of withdrawal when a medication is stopped    Increased sensitivity to pain    Constipation    Nausea, vomiting, and dry mouth    Sleepiness and dizziness    Confusion    Depression    Low levels of testosterone that can result in lower sex drive, energy, and strength    Itching and sweating    RISKS ARE GREATER WITH:    History of drug misuse, substance use disorder, or overdose    Mental health conditions (such as depression or anxiety)    Sleep apnea    Older age (65 years or older)    Pregnancy    Avoid alcohol while taking prescription opioids.   Also, unless specifically advised by your health care provider, medications to avoid include:    Benzodiazepines (such as Xanax or Valium)    Muscle relaxants (such as Soma or Flexeril)    Hypnotics (such as Ambien or Lunesta)    Other prescription opioids    KNOW YOUR OPTIONS:  Talk to your health care provider about ways to manage your pain that do not involve prescription opioids. Some of these options may actually work better and have fewer risks and side effects:    Pain relievers such as acetaminophen, ibuprofen, and naproxen    Some medications that are also used for depression or seizures    Physical therapy and exercise    Cognitive behavioral therapy, a psychological, goal-directed approach, in which patients learn how to modify physical, behavioral, and emotional triggers of pain and stress    IF YOU ARE  PRESCRIBED OPIOIDS FOR PAIN:    Never take opioids in greater amounts or more often than prescribed    Follow up with your primary health care provider and work together to create a plan on how to manage your pain.    Talk about ways to help manage your pain that do not involve prescription opioids    Talk about all concerns and side effects    Help prevent misuse and abuse    Never sell or share prescription opioids    Never use another person's prescription opioids    Store prescription opioids in a secure place and out of reach of others (this may include visitors, children, friends, and family)    Visit www.cdc.gov/drugoverdose to learn about risks of opioid abuse and overdose    If you believe you may be struggling with addiction, tell your health care provider and ask for guidance or call [facility]'s National Helpline at 7-462-394-HELP    LEARN MORE / www.cdc.gov/drugoverdose/prescribing/guideline.html    Safely dispose of unused prescription opioids: Find your local drug take-back programs and more information about the importance of safe disposal at www.doseofreality.[place].gov             Medication List: This is a list of all your medications and when to take them. Check marks below indicate your daily home schedule. Keep this list as a reference.      Medications           Morning Afternoon Evening Bedtime As Needed    oxyCODONE IR 5 MG tablet   Commonly known as:  ROXICODONE   Take 1-2 tablets (5-10 mg) by mouth every 3 hours as needed for pain or other (Moderate to Severe)

## 2018-01-26 NOTE — ANESTHESIA POSTPROCEDURE EVALUATION
Patient: Kwasi Mcelroy    Procedure(s):  Robotic assisted right inguinal and femoral hernia repair with mesh  - Wound Class: I-Clean    Diagnosis:right inguinal hernia possible left  Diagnosis Additional Information: Pre-operative diagnosis:  right inguinal hernia, possible left inguinal hernia   Post-operative diagnosis:  right indirect inguinal hernia and right femoral hernia   Procedure:  robotic assisted repair of right inguinal and femoral hernias         Anesthesia Type:  General, ETT    Note:  Anesthesia Post Evaluation    Patient location during evaluation: PACU  Patient participation: Able to fully participate in evaluation  Level of consciousness: awake and alert  Pain management: adequate  Airway patency: patent  Cardiovascular status: acceptable  Respiratory status: acceptable  Hydration status: acceptable  PONV: none     Anesthetic complications: None          Last vitals:  Vitals:    01/26/18 1540 01/26/18 1545 01/26/18 1602   BP:  125/85 128/80   Resp:  14 15   Temp:  98.6  F (37  C) 98.1  F (36.7  C)   SpO2: 98% 97% 97%         Electronically Signed By: Fred Blake MD  January 26, 2018  4:52 PM

## 2018-01-26 NOTE — ANESTHESIA PREPROCEDURE EVALUATION
Anesthesia Evaluation     . Pt has had prior anesthetic. Type: General    No history of anesthetic complications          ROS/MED HX    ENT/Pulmonary:  - neg pulmonary ROS     Neurologic:  - neg neurologic ROS     Cardiovascular:  - neg cardiovascular ROS       METS/Exercise Tolerance:     Hematologic: Comments: Lab Test        12/04/17                       0834          WBC          5.6           HGB          14.4          MCV          96            PLT          210            Lab Test        12/04/17                       0834          NA           142           POTASSIUM    4.2           CHLORIDE     107           CO2          24            BUN          19            CR           1.07          ANIONGAP     11            ALMA          9.3           GLC          97                    Musculoskeletal:  - neg musculoskeletal ROS       GI/Hepatic:  - neg GI/hepatic ROS       Renal/Genitourinary:     (+) chronic renal disease, Nephrolithiasis ,       Endo:  - neg endo ROS       Psychiatric:         Infectious Disease:  - neg infectious disease ROS       Malignancy:         Other:    - neg other ROS                 Physical Exam  Normal systems: cardiovascular, pulmonary and dental    Airway   Mallampati: II  TM distance: >3 FB  Neck ROM: full    Dental     Cardiovascular       Pulmonary                     Anesthesia Plan      History & Physical Review  History and physical reviewed and following examination; no interval change.    ASA Status:  1 .    NPO Status:  > 8 hours    Plan for General and ETT with Intravenous induction. Maintenance will be Balanced.    PONV prophylaxis:  Dexamethasone or Solumedrol and Ondansetron (or other 5HT-3)       Postoperative Care  Postoperative pain management:  IV analgesics.      Consents  Anesthetic plan, risks, benefits and alternatives discussed with:  Patient.  Use of blood products discussed: Yes.   Use of blood products discussed with Patient.  Consented to blood products.   .                          .

## 2018-01-26 NOTE — OP NOTE
General Surgery Operative Note    Pre-operative diagnosis:  right inguinal hernia, possible left inguinal hernia    Post-operative diagnosis:  right indirect inguinal hernia and right femoral hernia    Procedure:  robotic assisted repair of right inguinal and femoral hernias    Surgeon: Shaheen Kee MD   Assistant(s): Dave Smith PA-C - the physician assistant was medically necessary to assist in prepping, positioning, camera operation, retraction/exposure and instrument exchange.     Anesthesia: General    Estimated blood loss: 5 cc's   Drains placed: None   Complications:  None   Findings:   prominent right indirect inguinal hernia with a large sac.  Small fat containing femoral hernia with two defects.  Repair was achieved using preperitoneal Progrip mesh.  There was no evidence of a left-sided hernia.       Indications for operation: This is a 52-year-old gentleman who presented with right inguinal bulging.  Exam revealed an obvious right inguinal hernia and a questionable left inguinal hernia.  I recommended robotic-assisted repair, and the procedure, along with its risks and complications, was discussed with the patient.  He agreed to proceed.    Details of the operation: After informed consent, the patient was taken to the operating room where he underwent satisfactory induction of general anesthesia.  The patient was sterilely prepped and draped and a supraumbilical skin incision was made.  Dissection was carried bluntly down to the fascia, which was opened very slightly using electrocautery.  The robotic camera port was now placed without difficulty.  Pneumoperitoneum was achieved using CO2 insufflation, and an 8 mm port was then placed on each side under direct visualization.  The patient was placed in slight Trendelenburg position.  The robot was now brought in and docked without difficulty.  I then proceeded to the robotic console.    The left side was inspected first and no evidence of a hernia  was seen.  The right side revealed a large indirect sac.  The peritoneum was scored above the level of the ASIS and the preperitoneal space was entered.  This was then developed medially and laterally, carrying the dissection down below the level of the pubis medially and deep to the internal ring laterally.  Two small hernia defects were seen coming just posterior to the inguinal ligament, consistent with an unusual femoral hernia.  This contained only fat.  This was reduced without difficulty.  The indirect sac was now reduced after carefully  this from the cord structures.  The sac was opened to facilitate the dissection.  Eventually we were able to get this dissected well down away from the division of the vas and the vessels.  Once the preperitoneal space was fully developed, a piece of Progrip mesh was appropriately trimmed on the corners and deployed within the abdomen.  This was placed so that it was centered over the internal ring.  It also nicely cover the femoral defects.  The mesh was deployed so that it lay smoothly.  The peritoneum was now closed using a running 3-0V lock suture.  The opening within the sac was also closed using running 3-0V lock suture.  The trochars were now removed and the supraumbilical fascia was closed using interrupted 0 Vicryl sutures.    The patient tolerated the procedure well and was transferred to the recovery room in satisfactory condition.  Sponge and needle counts were correct at the close of the case.    Specimens: * No specimens in log *        Shaheen Kee MD

## 2018-01-26 NOTE — DISCHARGE INSTRUCTIONS
HOME CARE FOLLOWING HERNIA REPAIR  JESI Pak, NUNO Madera, JESI Sahu, JOHNSON Walls & Chapo    DIET:  No restrictions. Increased fluid intake is recommended. While taking pain medications, increase dietary fiber or add a fiber supplementation like Metamucil or Citrucel to help prevent constipation - a possible side effect of pain medications.  If taking Metamucil or Citrucel, take with plenty of fluids as instructed.    NAUSEA:  If nauseated from the anesthetic/pain meds; rest in bed, get up cautiously with assistance, and drink clear liquids (juice, tea, broth).    ACTIVITY:  Light Activity -- you may immediately be up and about as tolerated.  Driving -- you may drive when comfortable and off narcotic pain medications.  Light Work -- resume when comfortable off pain medications.  (If you can drive, you probably can work.)  Strenuous Work/Activity -- limit lifting to 20 pounds for 3 weeks.  Active Sports (running, biking, etc.) -- cautiously resume after 3 weeks.    INCISIONAL CARE:    If you have a dressing in place, keep clean and dry for 48 hours; you may replace the gauze if it becomes soiled.    After 48 hours you may remove the dressing and shower.  Do not submerse incision in water for 1 week.    If you have a Dermabond dressing (a type of skin glue), you may shower immediately.    Sutures will absorb and need not be removed.    If present, leave the steri-strips (white paper tapes) in place until they fall off.    If present, leave Dermabond glue in place until it wears/flakes off.    Expect a variable amount of swelling/black and blue discoloration that may involve the penis/scrotum or labia.    Some numbness around the incision is common.    A lump/ridge under the incision is normal and will gradually resolve.    DISCOMFORT:  Local anesthetic placed at surgery should provide relief for 4-8 hours.  Begin taking pain pills before discomfort is severe.  Take the pain  medication with some food, when possible, to minimize side effects.  Intermittent use of ice packs to the hernia repair site may help during the first 48 hours.  Expect gradual improvement.    RETURN APPOINTMENT:  Schedule a follow-up visit 1-3 weeks post-op (you may do this any time after surgery is scheduled).  Office Phone:  [phone]    CONTACT US IF THE FOLLOWING DEVELOPS:  1.  A fever that is above 101    2.  If there is a large amount of drainage, bleeding, or swelling.  3.  Severe pain that is not relieved by your prescription.  4.  Drainage that is thick, cloudy, yellow, green or white.  5.  Any other questions not answered by  Frequently Asked Questions  sheet.              FREQUENTLY ASKED QUESTIONS AFTER SURGERY  Win Lam, JESI Kee, NUNO Madera, JULIUS Farr, JESI Pearce, JOHNSON Walls  &  JOSE LUIS Goetz      Q:  How should my incision look?    A:  Normally your incision will appear slightly swollen with light redness directly along the incision itself as it heals.  It may feel like a bump or ridge as the healing/scarring happens, and over time (3-4 months) this bump or ridge feeling should slowly go away.  In general, clear or pink watery drainage can be normal at first as your incision heals, but should decrease over time.    Q:  How do I know if my incision is infected?  A:  Look at your incision for signs of infection, like redness around the incision spreading to surrounding skin, or drainage of cloudy or foul-smelling drainage.  If you feel warm, check your temperature to see if you are running a fever.    **If any of these things occur, please notify the nurse at our office.  We may need you to come into the office for an incision check.      Q:  How do I take care of my incision?  A:  If you have a dressing in place - Starting the day after surgery, replace the dressing 1-2 times a day until there is no further drainage from the incision.  At that time, a dressing is no longer needed.  Try to  minimize tape on the skin if irritation is occurring at the tape sites.  If you have significant irritation from tape on the skin, please call the office to discuss other method of dressing your incision.    Small pieces of tape called  steri-strips  may be present directly overlying your incision; these may be removed 10 days after surgery unless otherwise specified by your surgeon.  If these tapes start to loosen at the ends, you may trim them back until they fall off or are removed.    A:  If you had  Dermabond  tissue glue used as a dressing (this causes your incision to look shiny with a clear covering over it) - This type of dressing wears off with time and does not require more dressings over the top unless it is draining around the glue as it wears off.  Do not apply ointments or lotions over the incisions until the glue has completely worn off.    Q:  There is a piece of tape or a sticky  lead  still on my skin.  Can I remove this?  A:  Sometimes the sticky  leads  used for monitoring during surgery or for evaluation in the emergency department are not all removed while you are in the hospital.  These sometimes have a tab or metal dot on them.  You can easily remove these on your own, like taking off a band-aid.  If there is a gel substance under the  lead , simply wipe/clean it off with a washcloth or paper towel.      Q:  What can I do to minimize constipation (very hard stools, or lack of stools)?  A:  Stay well hydrated.  Increase your dietary fiber intake or take a fiber supplement -with plenty of water.  Walk around frequently.  You may consider an over-the-counter stool-softener.  Your Pharmacist can assist you with choosing one that is stocked at your pharmacy.  Constipation is also one of the most common side effects of pain medication.  If you are using pain medication, be pro-active and try to PREVENT problems with constipation by taking the steps above BEFORE constipation becomes a  problem.    Q:  What do I do if I need more pain medications?  A:  Call the office to receive refills.  Be aware that certain pain meds cannot be called into a pharmacy and actually require a paper prescription.  A change may be made in your pain med as you progress thru your recovery period or if you have side effects to certain meds.    --Pain meds are NOT refilled after 5pm on weekdays, and NOT AT ALL on the weekends, so please look ahead to prevent problems.      Q:  Why am I having a hard time sleeping now that I am at home?  A:  Many medications you receive while you are in the hospital can impact your sleep for a number of days after your surgery/hospitalization.  Decreased level of activity and naps during the day may also make sleeping at night difficult.  Try to minimize day-time naps, and get up frequently during the day to walk around your home during your recovery time.  Sleep aides may be of some help, but are not recommended for long-term use.      Q:  I am having some back discomfort.  What should I do?  A:  This may be related to certain positioning that was required for your surgery, extended periods of time in bed, or other changes in your overall activity level.  You may try ice, heat, acetaminophen, or ibuprofen to treat this temporarily.  Note that many pain medications have acetaminophen in them and would state this on the prescription bottle.  Be sure not to exceed the maximum of 4000mg per day of acetaminophen.     **If the pain you are having does not resolve, is severe, or is a flare of back pain you have had on other occasions prior to surgery, please contact your primary physician for further recommendations or for an appointment to be examined at their office.    Q:  Why am I having headaches?  A:  Headaches can be caused by many things:  caffeine withdrawal, use of pain meds, dehydration, high blood pressure, lack of sleep, over-activity/exhaustion, flare-up of usual migraine  headaches.  If you feel this is related to muscle tension (a band-like feeling around the head, or a pressure at the low-back of the head) you may try ice or heat to this area.  You may need to drink more fluids (try electrolyte drink like Gatorade), rest, or take your usual migraine medications.   **If your headaches do not resolve, worsen, are accompanied by other symptoms, or if your blood pressure is high, please call your primary physician for recommendation and/or examination.    Q:  I am unable to urinate.  What do I do?  A:  A small percentage of people can have difficulty urinating initially after surgery.  This includes being able to urinate only a very small amount at a time and feeling discomfort or pressure in the very low abdomen.  This is called  urinary retention , and is actually an urgent situation.  Proceed to your nearest Emergency department for evaluation (not an Urgent Care Center).  Sometimes the bladder does not work correctly after certain medications you receive during surgery, or related to certain procedures.  You may need to have a catheter placed until your bladder recovers.  When planning to go to an Emergency department, it may help to call the ER to let them know you are coming in for this problem after a surgery.  This may help you get in quicker to be evaluated.  **If you have symptoms of a urinary tract infection, please contact your primary physician for the proper evaluation and treatment.    If you have other questions, please call the office Monday thru Friday between 8am and 5pm to discuss with the nurse or physician assistant.  #[phone]    There is a surgeon ON CALL on weekday evenings and over the weekend in case of urgent need only, and may be contacted at the same number.    If you are having an emergency, call 911 or proceed to your nearest emergency department.            GENERAL ANESTHESIA OR SEDATION ADULT DISCHARGE INSTRUCTIONS   SPECIAL PRECAUTIONS FOR 24  HOURS AFTER SURGERY    IT IS NOT UNUSUAL TO FEEL LIGHT-HEADED OR FAINT, UP TO 24 HOURS AFTER SURGERY OR WHILE TAKING PAIN MEDICATION.  IF YOU HAVE THESE SYMPTOMS; SIT FOR A FEW MINUTES BEFORE STANDING AND HAVE SOMEONE ASSIST YOU WHEN YOU GET UP TO WALK OR USE THE BATHROOM.    YOU SHOULD REST AND RELAX FOR THE NEXT 24 HOURS AND YOU MUST MAKE ARRANGEMENTS TO HAVE SOMEONE STAY WITH YOU FOR AT LEAST 24 HOURS AFTER YOUR DISCHARGE.  AVOID HAZARDOUS AND STRENUOUS ACTIVITIES.  DO NOT MAKE IMPORTANT DECISIONS FOR 24 HOURS.    DO NOT DRIVE ANY VEHICLE OR OPERATE MECHANICAL EQUIPMENT FOR 24 HOURS FOLLOWING THE END OF YOUR SURGERY.  EVEN THOUGH YOU MAY FEEL NORMAL, YOUR REACTIONS MAY BE AFFECTED BY THE MEDICATION YOU HAVE RECEIVED.    DO NOT DRINK ALCOHOLIC BEVERAGES FOR 24 HOURS FOLLOWING YOUR SURGERY.    DRINK CLEAR LIQUIDS (APPLE JUICE, GINGER ALE, 7-UP, BROTH, ETC.).  PROGRESS TO YOUR REGULAR DIET AS YOU FEEL ABLE.    YOU MAY HAVE A DRY MOUTH, A SORE THROAT, MUSCLES ACHES OR TROUBLE SLEEPING.  THESE SHOULD GO AWAY AFTER 24 HOURS.    CALL YOUR DOCTOR FOR ANY OF THE FOLLOWING:  SIGNS OF INFECTION (FEVER, GROWING TENDERNESS AT THE SURGERY SITE, A LARGE AMOUNT OF DRAINAGE OR BLEEDING, SEVERE PAIN, FOUL-SMELLING DRAINAGE, REDNESS OR SWELLING.    IT HAS BEEN OVER 8 TO 10 HOURS SINCE SURGERY AND YOU ARE STILL NOT ABLE TO URINATE (PASS WATER).           You received Toradol, an IV form of ibuprofen (Motrin) at 1:30PM.  Do not take any ibuprofen products until 7:30PM.  You had one oxyCODONE IR 5 MG tablet at 3:35 PM.

## 2018-02-12 ENCOUNTER — OFFICE VISIT (OUTPATIENT)
Dept: SURGERY | Facility: CLINIC | Age: 53
End: 2018-02-12
Payer: COMMERCIAL

## 2018-02-12 VITALS
HEIGHT: 74 IN | HEART RATE: 63 BPM | SYSTOLIC BLOOD PRESSURE: 122 MMHG | BODY MASS INDEX: 21.43 KG/M2 | OXYGEN SATURATION: 99 % | RESPIRATION RATE: 16 BRPM | DIASTOLIC BLOOD PRESSURE: 60 MMHG | WEIGHT: 167 LBS

## 2018-02-12 DIAGNOSIS — Z09 SURGICAL FOLLOWUP VISIT: Primary | ICD-10-CM

## 2018-02-12 PROCEDURE — 99024 POSTOP FOLLOW-UP VISIT: CPT | Performed by: SURGERY

## 2018-02-12 NOTE — LETTER
2018    Re: Kwasi Mcelroy, : 1965    The patient presents today to follow-up after his robotic-assisted right inguinal and femoral hernia repair.  No hernia was identified on the left.  He has had some intermittent vague pains, but very little significant discomfort.  He only took a few doses of his pain pills.     The patient's incisions are well-healed.  There is no evidence of recurrent hernia.     The patient is doing well postoperatively.  He may return to unrestricted activity three weeks postoperatively.  He may return to see me as needed.     Shaheen Kee MD  [facility]

## 2018-02-12 NOTE — MR AVS SNAPSHOT
"              After Visit Summary   2018    Kwasi Mcelroy    MRN: [id number]           Patient Information     Date Of Birth          1965        Visit Information        Provider Department      2018 10:00 AM Shaheen Kee MD [facility]      Today's Diagnoses     Surgical followup visit    -  1       Follow-ups after your visit        Who to contact     If you have questions or need follow up information about today's clinic visit or your schedule please contact [facility] directly at [phone].  Normal or non-critical lab and imaging results will be communicated to you by [employer], letter or phone within 4 business days after the clinic has received the results. If you do not hear from us within 7 days, please contact the clinic through [employer] or phone. If you have a critical or abnormal lab result, we will notify you by phone as soon as possible.  Submit refill requests through [employer] or call your pharmacy and they will forward the refill request to us. Please allow 3 business days for your refill to be completed.          Additional Information About Your Visit        MyChart Information     [employer] lets you send messages to your doctor, view your test results, renew your prescriptions, schedule appointments and more. To sign up, go to [email or web address] . Click on \"Log in\" on the left side of the screen, which will take you to the Welcome page. Then click on \"Sign up Now\" on the right side of the page.     You will be asked to enter the access code listed below, as well as some personal information. Please follow the directions to create your username and password.     Your access code is: [email or web address]  Expires: 2018  6:18 PM     Your access code will  in 90 days. If you need help or a new code, please call your [place] clinic or [phone].        Care EveryWhere ID     This is your " "Care EveryWhere ID. This could be used by other organizations to access your [place] medical records  [id number]        Your Vitals Were     Pulse Respirations Height Pulse Oximetry BMI (Body Mass Index)       63 16 6' 2\" (1.88 m) 99% 21.44 kg/m2        Blood Pressure from Last 3 Encounters:   02/12/18 122/60   01/26/18 116/84   01/18/18 122/70    Weight from Last 3 Encounters:   02/12/18 167 lb (75.8 kg)   01/26/18 167 lb (75.8 kg)   01/18/18 171 lb (77.6 kg)              Today, you had the following     No orders found for display       Primary Care Provider Office Phone # Fax #    Sophie Adler [phone]       [address]  [facility] [address]        Equal Access to Services     [facility]: Hadii aad ku hadasho Soomaali, waaxda luqadaha, qaybta kaalmada adeegyada, waxay idiin hayaan sandra roman . So [facility] [phone].    ATENCIÓN: Si habla español, tiene a jarvis disposición servicios gratuitos de asistencia lingüística. Cathi al [phone].    We comply with applicable federal civil rights laws and [place] laws. We do not discriminate on the basis of race, color, national origin, age, disability, sex, sexual orientation, or gender identity.            Thank you!     Thank you for choosing [facility]  for your care. Our goal is always to provide you with excellent care. Hearing back from our patients is one way we can continue to improve our services. Please take a few minutes to complete the written survey that you may receive in the mail after your visit with us. Thank you!             Your Updated Medication List - Protect others around you: Learn how to safely use, store and throw away your medicines at www.disposemymeds.org.          This list is accurate as of 2/12/18 10:27 AM.  Always use your most recent med list.                   Brand Name Dispense Instructions for use Diagnosis    oxyCODONE IR 5 MG tablet    ROXICODONE    " 30 tablet    Take 1-2 tablets (5-10 mg) by mouth every 3 hours as needed for pain or other (Moderate to Severe)    Right inguinal hernia

## 2018-02-12 NOTE — PROGRESS NOTES
The patient presents today to follow-up after his robotic-assisted right inguinal and femoral hernia repair.  No hernia was identified on the left.  He has had some intermittent vague pains, but very little significant discomfort.  He only took a few doses of his pain pills.    The patient's incisions are well-healed.  There is no evidence of recurrent hernia.    The patient is doing well postoperatively.  He may return to unrestricted activity three weeks postoperatively.  He may return to see me as needed.    Shaheen Kee MD  [facility]    Please route or send letter to:  Primary Care Provider (PCP)

## 2018-05-14 ENCOUNTER — OFFICE VISIT (OUTPATIENT)
Dept: INTERNAL MEDICINE | Facility: CLINIC | Age: 53
End: 2018-05-14
Payer: COMMERCIAL

## 2018-05-14 VITALS
TEMPERATURE: 98.3 F | BODY MASS INDEX: 21.53 KG/M2 | HEIGHT: 74 IN | WEIGHT: 167.8 LBS | DIASTOLIC BLOOD PRESSURE: 70 MMHG | SYSTOLIC BLOOD PRESSURE: 100 MMHG | RESPIRATION RATE: 16 BRPM | HEART RATE: 81 BPM | OXYGEN SATURATION: 100 %

## 2018-05-14 DIAGNOSIS — R53.83 OTHER FATIGUE: ICD-10-CM

## 2018-05-14 DIAGNOSIS — E78.5 HYPERLIPIDEMIA LDL GOAL <160: ICD-10-CM

## 2018-05-14 DIAGNOSIS — R09.89 CHRONIC THROAT CLEARING: Primary | ICD-10-CM

## 2018-05-14 LAB
ANION GAP SERPL CALCULATED.3IONS-SCNC: 3 MMOL/L (ref 3–14)
BUN SERPL-MCNC: 14 MG/DL (ref 7–30)
CALCIUM SERPL-MCNC: 9.4 MG/DL (ref 8.5–10.1)
CHLORIDE SERPL-SCNC: 107 MMOL/L (ref 94–109)
CHOLEST SERPL-MCNC: 213 MG/DL
CO2 SERPL-SCNC: 28 MMOL/L (ref 20–32)
CORTIS SERPL-MCNC: 11.6 UG/DL (ref 4–22)
CREAT SERPL-MCNC: 0.99 MG/DL (ref 0.66–1.25)
GFR SERPL CREATININE-BSD FRML MDRD: 79 ML/MIN/1.7M2
GLUCOSE SERPL-MCNC: 93 MG/DL (ref 70–99)
HDLC SERPL-MCNC: 53 MG/DL
HGB BLD-MCNC: 14.3 G/DL (ref 13.3–17.7)
LDLC SERPL CALC-MCNC: 141 MG/DL
NONHDLC SERPL-MCNC: 160 MG/DL
POTASSIUM SERPL-SCNC: 4.8 MMOL/L (ref 3.4–5.3)
SODIUM SERPL-SCNC: 138 MMOL/L (ref 133–144)
TRIGL SERPL-MCNC: 94 MG/DL

## 2018-05-14 PROCEDURE — 80048 BASIC METABOLIC PNL TOTAL CA: CPT | Performed by: INTERNAL MEDICINE

## 2018-05-14 PROCEDURE — 80061 LIPID PANEL: CPT | Performed by: INTERNAL MEDICINE

## 2018-05-14 PROCEDURE — 85018 HEMOGLOBIN: CPT | Performed by: INTERNAL MEDICINE

## 2018-05-14 PROCEDURE — 82533 TOTAL CORTISOL: CPT | Performed by: INTERNAL MEDICINE

## 2018-05-14 PROCEDURE — 99214 OFFICE O/P EST MOD 30 MIN: CPT | Performed by: INTERNAL MEDICINE

## 2018-05-14 PROCEDURE — 36415 COLL VENOUS BLD VENIPUNCTURE: CPT | Performed by: INTERNAL MEDICINE

## 2018-05-14 NOTE — PATIENT INSTRUCTIONS
Plan:  1. Allergy medication daily - a trial for 1-2 weeks  2. ENT referral   3. Labs today     The 10-year ASCVD risk score (Epifaniodarin CONNOLLY Jr, et al., 2013) is: 3.5%    Values used to calculate the score:      Age: 52 years      Sex: Male      Is Non- : No      Diabetic: No      Tobacco smoker: No      Systolic Blood Pressure: 100 mmHg      Is BP treated: No      HDL Cholesterol: 54 mg/dL      Total Cholesterol: 239 mg/dL

## 2018-05-14 NOTE — NURSING NOTE
"Vital signs:  Temp: 98.3  F (36.8  C) Temp src: Oral BP: 100/70 Pulse: 81   Resp: 16 SpO2: 100 %     Height: 6' 2\" (188 cm) Weight: 167 lb 12.8 oz (76.1 kg)  Estimated body mass index is 21.54 kg/(m^2) as calculated from the following:    Height as of this encounter: 6' 2\" (1.88 m).    Weight as of this encounter: 167 lb 12.8 oz (76.1 kg).          "

## 2018-05-14 NOTE — PROGRESS NOTES
Dr Ha's note      Patient's instructions / PLAN:                                                        Plan:  1. Allergy medication daily - a trial for 1-2 weeks  2. ENT referral   3. Labs today       ASSESSMENT & PLAN:                                                      (R68.78) Chronic throat clearing  (primary encounter diagnosis)  Comment:   Plan: OTOLARYNGOLOGY REFERRAL        as above     (R53.83) Other fatigue  Comment: non specific  Plan: Hemoglobin, Cortisol, Basic metabolic panel        If persist we can look into sleep study    (E78.5) Hyperlipidemia LDL goal <160  Comment: See below discussion  Plan: Lipid panel reflex to direct LDL Fasting               Chief complaint:                                                      Throat  Fatigue   Hyperlipidemia      SUBJECTIVE:   Kwasi Mcelroy is a 52 year old male who presents to clinic today for the following health issues:    Throat discomfort  -- for the last 3 weeks on/off  -- he needs to clear the throat constantly  -- fells food is stuck in the throat sometimes, mostly on the R side  -- no voice change  -- no pain, no fever  -- no runny nose, he doesn't feel postnasal drip. He doesn't think he has any allergies.   -- never smoked   --His father had throat cancer, and he is concerned for his throat    More tired in the last 2 weeks  -- he sleeps well, slept 10 h daily over the weekend   -- in the past when he was feeling tired he was advised to eat more meat  -- when he was young he had episodes of fainting and low BS   -- no CHUCKIE  --he was on chronic prednisone treatment for eye problem up to 3 4 years ago.  I doubt he has cortisone insufficiency, but I will check the level    Hyperlipidemia  --He is not a big eater.  His BMI is 21.5  --He tried to change the diet and eatingmore oatmeal.  But he became more hungry and ate more.  --We talked ASCVD risk factor which is 3.5% for him.  He is not interested to start medications.  He does not have  "other risk factors: Hypertension hyperlipidemia family history.    Review of Systems:                                                      ROS: negative for fever, chills, cough, wheezes, chest pain, shortness of breath, vomiting, abdominal pain, leg swelling       OBJECTIVE:             Physical exam:  Blood pressure 100/70, pulse 81, temperature 98.3  F (36.8  C), temperature source Oral, resp. rate 16, height 6' 2\" (1.88 m), weight 167 lb 12.8 oz (76.1 kg), SpO2 100 %.   NAD, appears comfortable  Skin: no rashes   HEENT: PERRLA, EOMI, pink conjunctiva, anicteric sclerae, bilateral tympanic membranes are clinically normal, oropharynx is normal color  Neck: supple, no JVD, No thyroidmegaly. Lymph nodes nonpalpable cervical and supraclavicular.  Chest: clear to auscultation bilaterally, good respiratory effort  Heart: S1 S2, RRR, no mgr appreciated  Abdomen: soft, not tender,   Extremities: no edema,  Neurologic: A, Ox3, no focal signs appreciated    PMHx: reviewed  Past Medical History:   Diagnosis Date     Kidney calculus 2007     Uveitis 2010      PSHx: reviewed  Past Surgical History:   Procedure Laterality Date     CATARACT IOL, RT/LT Bilateral      DAVINCI HERNIORRHAPHY INGUINAL Right 1/26/2018    Procedure: DAVINCI HERNIORRHAPHY INGUINAL;  Robotic assisted right inguinal and femoral hernia repair with mesh ;  Surgeon: Shaheen Kee MD;  Location: [facility] OR        Meds: reviewed  No current outpatient prescriptions on file.       Soc Hx: reviewed  Fam Hx: reviewed          Sophie Ha MD  Internal Medicine     "

## 2018-05-14 NOTE — MR AVS SNAPSHOT
After Visit Summary   5/14/2018    Kwasi Mcelroy    MRN: [id number]           Patient Information     Date Of Birth          1965        Visit Information        Provider Department      5/14/2018 8:20 AM Sophie Adler MD [facility]        Today's Diagnoses     Chronic throat clearing    -  1    Other fatigue        Hyperlipidemia LDL goal <160          Care Instructions    Plan:  1. Allergy medication daily - a trial for 1-2 weeks  2. ENT referral   3. Labs today     The 10-year ASCVD risk score ([employer] MOHSEN Jr, et al., 2013) is: 3.5%    Values used to calculate the score:      Age: 52 years      Sex: Male      Is Non- : No      Diabetic: No      Tobacco smoker: No      Systolic Blood Pressure: 100 mmHg      Is BP treated: No      HDL Cholesterol: 54 mg/dL      Total Cholesterol: 239 mg/dL            Follow-ups after your visit        Additional Services     OTOLARYNGOLOGY REFERRAL       Your provider has referred you to:     FHN: [facility] [place] [phone]   [email or web address]    FHN: [facility] [phone]   [email or web address]  FHN: [facility] [phone]   [email or web address]  FHN: [place] [facility] [place] [place] [phone]   [email or web address]  FHN: [facility] [place] [phone]   [email or web address]  FHN: [facility] [phone]   [email or web address]  FHN: [facility] [phone]   [email or web address]    Please be aware that coverage of these services is subject to the terms and limitations of your health insurance plan.  Call member services at your health plan with any benefit or coverage  "questions.      Please bring the following to your appointment:  >>   Any x-rays, CTs or MRIs which have been performed.  Contact the facility where they were done to arrange for  prior to your scheduled appointment.  Any new CT, MRI or other procedures ordered by your specialist must be performed at a [place] facility or coordinated by your clinic's referral office.    >>   List of current medications   >>   This referral request   >>   Any documents/labs given to you for this referral                  Who to contact     If you have questions or need follow up information about today's clinic visit or your schedule please contact [facility] directly at [phone].  Normal or non-critical lab and imaging results will be communicated to you by MyChart, letter or phone within 4 business days after the clinic has received the results. If you do not hear from us within 7 days, please contact the clinic through [employer] or phone. If you have a critical or abnormal lab result, we will notify you by phone as soon as possible.  Submit refill requests through "[employer] or call your pharmacy and they will forward the refill request to us. Please allow 3 business days for your refill to be completed.          Additional Information About Your Visit        "[employer] Information     "[employer] lets you send messages to your doctor, view your test results, renew your prescriptions, schedule appointments and more. To sign up, go to [email or web address] . Click on \"Log in\" on the left side of the screen, which will take you to the Welcome page. Then click on \"Sign up Now\" on the right side of the page.     You will be asked to enter the access code listed below, as well as some personal information. Please follow the directions to create your username and password.     Your access code is: [email or web address]  Expires: 2018  8:54 AM     Your access code will  in 90 days. If you need help or a new code, " "please call your [facility] clinic or [phone].        Care EveryWhere ID     This is your Care EveryWhere ID. This could be used by other organizations to access your [facility] medical records  [id number]        Your Vitals Were     Pulse Temperature Respirations Height Pulse Oximetry BMI (Body Mass Index)    81 98.3  F (36.8  C) (Oral) 16 6' 2\" (1.88 m) 100% 21.54 kg/m2       Blood Pressure from Last 3 Encounters:   05/14/18 100/70   02/12/18 122/60   01/26/18 116/84    Weight from Last 3 Encounters:   05/14/18 167 lb 12.8 oz (76.1 kg)   02/12/18 167 lb (75.8 kg)   01/26/18 167 lb (75.8 kg)              We Performed the Following     Basic metabolic panel     Cortisol     Hemoglobin     Lipid panel reflex to direct LDL Fasting     OTOLARYNGOLOGY REFERRAL        Primary Care Provider Office Phone # Fax #    Sophie Adler [phone]       [address] [facility] [address]        Equal Access to Services     [facility]: Hadii aad ku hadasho Soomaali, waaxda luqadaha, qaybta kaalmada adesandra, shell roman . So [facility] [phone].    ATENCIÓN: Si habla español, tiene a jarvis disposición servicios gratuitos de asistencia lingüística. [facility] [phone].    We comply with applicable federal civil rights laws and [place] laws. We do not discriminate on the basis of race, color, national origin, age, disability, sex, sexual orientation, or gender identity.            Thank you!     Thank you for choosing [facility]  for your care. Our goal is always to provide you with excellent care. Hearing back from our patients is one way we can continue to improve our services. Please take a few minutes to complete the written survey that you may receive in the mail after your visit with us. Thank you!             Your Updated Medication List - Protect others around you: Learn how to safely use, store and throw away your medicines at " www.disposemymeds.org.      Notice  As of 5/14/2018  8:56 AM    You have not been prescribed any medications.

## 2018-05-14 NOTE — LETTER
[facility]  [address], [address]  [place] [address]  [phone]        May 17, 2018    Kwasi Mcelroy  [address] [place] [place] [facility] [address]            Dear Mr. Kwasi Mcelroy:    The recent blood tests results are in acceptable limits.  Your cholesterol numbers are on the high side, but they are much better compared with 6 months ago    Sincerely,    Sophie Ha MD  Internal Medicine    These are some general explanations for tests  WBC means White Blood Cells  Platelets are small blood cells that help with forming the blood clots along with other blood factors.  Electrolytes are Sodium, Potassium, Calcium, Magnesium, Phosphorus.  Liver tests are: AST, ALT, Bilirubin, Alkaline Phosphatase.  Kidney tests are Creatinine, GFR.  HDL Cholesterol - is the good cholesterol and it is good to have it high.  LDL cholesterol is the bad cholesterol and it is good to have it low.  It is recommended to have LDL less than 130 for people with hypertension and to have it less than 100 for people with heart disease, diabetes and chronic kidney disease.  Thyroid tests are TSH, T4, T3  A1c is a test that gives us an idea about how well was controlled the diabetes for the last 3 months.

## 2018-05-17 ENCOUNTER — TRANSFERRED RECORDS (OUTPATIENT)
Dept: HEALTH INFORMATION MANAGEMENT | Facility: CLINIC | Age: 53
End: 2018-05-17

## 2019-07-31 ENCOUNTER — ALLIED HEALTH/NURSE VISIT (OUTPATIENT)
Dept: NURSING | Facility: CLINIC | Age: 54
End: 2019-07-31
Payer: COMMERCIAL

## 2019-07-31 DIAGNOSIS — Z23 NEED FOR VACCINATION: Primary | ICD-10-CM

## 2019-07-31 PROCEDURE — 90707 MMR VACCINE SC: CPT

## 2019-07-31 PROCEDURE — 90471 IMMUNIZATION ADMIN: CPT

## 2019-12-16 ENCOUNTER — OFFICE VISIT (OUTPATIENT)
Dept: INTERNAL MEDICINE | Facility: CLINIC | Age: 54
End: 2019-12-16
Payer: COMMERCIAL

## 2019-12-16 VITALS
DIASTOLIC BLOOD PRESSURE: 81 MMHG | BODY MASS INDEX: 23.47 KG/M2 | RESPIRATION RATE: 16 BRPM | SYSTOLIC BLOOD PRESSURE: 120 MMHG | OXYGEN SATURATION: 99 % | HEIGHT: 72 IN | HEART RATE: 66 BPM | WEIGHT: 173.3 LBS | TEMPERATURE: 98.5 F

## 2019-12-16 DIAGNOSIS — E78.5 HYPERLIPIDEMIA LDL GOAL <160: ICD-10-CM

## 2019-12-16 DIAGNOSIS — Z71.89 ADVANCED DIRECTIVES, COUNSELING/DISCUSSION: ICD-10-CM

## 2019-12-16 DIAGNOSIS — Z12.5 SCREENING FOR PROSTATE CANCER: ICD-10-CM

## 2019-12-16 DIAGNOSIS — Z11.59 ENCOUNTER FOR HEPATITIS C SCREENING TEST FOR LOW RISK PATIENT: ICD-10-CM

## 2019-12-16 DIAGNOSIS — Z00.00 ROUTINE GENERAL MEDICAL EXAMINATION AT A HEALTH CARE FACILITY: Primary | ICD-10-CM

## 2019-12-16 LAB
ALBUMIN SERPL-MCNC: 4 G/DL (ref 3.4–5)
ALP SERPL-CCNC: 58 U/L (ref 40–150)
ALT SERPL W P-5'-P-CCNC: 15 U/L (ref 0–70)
ANION GAP SERPL CALCULATED.3IONS-SCNC: 4 MMOL/L (ref 3–14)
AST SERPL W P-5'-P-CCNC: 15 U/L (ref 0–45)
BILIRUB SERPL-MCNC: 0.6 MG/DL (ref 0.2–1.3)
BUN SERPL-MCNC: 11 MG/DL (ref 7–30)
CALCIUM SERPL-MCNC: 8.8 MG/DL (ref 8.5–10.1)
CHLORIDE SERPL-SCNC: 104 MMOL/L (ref 94–109)
CHOLEST SERPL-MCNC: 214 MG/DL
CO2 SERPL-SCNC: 28 MMOL/L (ref 20–32)
CREAT SERPL-MCNC: 1.02 MG/DL (ref 0.66–1.25)
ERYTHROCYTE [DISTWIDTH] IN BLOOD BY AUTOMATED COUNT: 12.8 % (ref 10–15)
GFR SERPL CREATININE-BSD FRML MDRD: 82 ML/MIN/{1.73_M2}
GLUCOSE SERPL-MCNC: 95 MG/DL (ref 70–99)
HCT VFR BLD AUTO: 43.2 % (ref 40–53)
HDLC SERPL-MCNC: 56 MG/DL
HGB BLD-MCNC: 14.3 G/DL (ref 13.3–17.7)
LDLC SERPL CALC-MCNC: 146 MG/DL
MCH RBC QN AUTO: 31.8 PG (ref 26.5–33)
MCHC RBC AUTO-ENTMCNC: 33.1 G/DL (ref 31.5–36.5)
MCV RBC AUTO: 96 FL (ref 78–100)
NONHDLC SERPL-MCNC: 158 MG/DL
PLATELET # BLD AUTO: 241 10E9/L (ref 150–450)
POTASSIUM SERPL-SCNC: 3.6 MMOL/L (ref 3.4–5.3)
PROT SERPL-MCNC: 7.2 G/DL (ref 6.8–8.8)
PSA SERPL-ACNC: 0.67 UG/L (ref 0–4)
RBC # BLD AUTO: 4.49 10E12/L (ref 4.4–5.9)
SODIUM SERPL-SCNC: 136 MMOL/L (ref 133–144)
TRIGL SERPL-MCNC: 62 MG/DL
TSH SERPL DL<=0.005 MIU/L-ACNC: 3.04 MU/L (ref 0.4–4)
WBC # BLD AUTO: 7.6 10E9/L (ref 4–11)

## 2019-12-16 PROCEDURE — 80053 COMPREHEN METABOLIC PANEL: CPT | Performed by: INTERNAL MEDICINE

## 2019-12-16 PROCEDURE — 99396 PREV VISIT EST AGE 40-64: CPT | Performed by: INTERNAL MEDICINE

## 2019-12-16 PROCEDURE — 36415 COLL VENOUS BLD VENIPUNCTURE: CPT | Performed by: INTERNAL MEDICINE

## 2019-12-16 PROCEDURE — G0103 PSA SCREENING: HCPCS | Performed by: INTERNAL MEDICINE

## 2019-12-16 PROCEDURE — 86803 HEPATITIS C AB TEST: CPT | Performed by: INTERNAL MEDICINE

## 2019-12-16 PROCEDURE — 85027 COMPLETE CBC AUTOMATED: CPT | Performed by: INTERNAL MEDICINE

## 2019-12-16 PROCEDURE — 80061 LIPID PANEL: CPT | Performed by: INTERNAL MEDICINE

## 2019-12-16 PROCEDURE — 84443 ASSAY THYROID STIM HORMONE: CPT | Performed by: INTERNAL MEDICINE

## 2019-12-16 ASSESSMENT — ENCOUNTER SYMPTOMS
DYSURIA: 0
MYALGIAS: 0
FEVER: 0
CONSTIPATION: 0
ARTHRALGIAS: 0
NAUSEA: 0
DIARRHEA: 0
FREQUENCY: 0
SORE THROAT: 0
NERVOUS/ANXIOUS: 0
COUGH: 0
SHORTNESS OF BREATH: 0
EYE PAIN: 0
HEADACHES: 0
WEAKNESS: 0
PARESTHESIAS: 0
ABDOMINAL PAIN: 0
DIZZINESS: 0
CHILLS: 0
PALPITATIONS: 0
HEARTBURN: 0
HEMATOCHEZIA: 0
JOINT SWELLING: 0
HEMATURIA: 0

## 2019-12-16 ASSESSMENT — MIFFLIN-ST. JEOR: SCORE: 1659.48

## 2019-12-16 NOTE — Clinical Note
CareEverywhere/Patient reports:-- colonoscopy done on 7/8/2016 with [facility]. It was normal . Info sent to abstraction Paper brought report copy - sent to abstraction -- flu vaccine in Nov 2019

## 2019-12-16 NOTE — PATIENT INSTRUCTIONS
Plan:  1.  Labs today - [address]   2. The following vaccines are recommended for you. Please check with your insurance about coverage.  Some insurances cover better if you have these vaccines at the pharmacy:  -- Shingerix vaccine - the newest vaccine for shingles

## 2019-12-16 NOTE — Clinical Note
CareEverywhere/Patient reports:-- colonoscopy done on 7/8/2016 with [facility]. It was normal . Info sent to [facility] Paper brought report copy - sent to abstraction

## 2019-12-16 NOTE — NURSING NOTE
"/81 (BP Location: Right arm, Patient Position: Sitting, Cuff Size: Adult Regular)   Pulse 86   Temp 98.5  F (36.9  C) (Oral)   Resp 16   Ht 1.83 m (6' 0.03\")   Wt 78.6 kg (173 lb 4.8 oz)   SpO2 99%   BMI 23.49 kg/m      "

## 2019-12-17 LAB — HCV AB SERPL QL IA: NONREACTIVE

## 2020-01-21 ENCOUNTER — OFFICE VISIT (OUTPATIENT)
Dept: INTERNAL MEDICINE | Facility: CLINIC | Age: 55
End: 2020-01-21
Payer: COMMERCIAL

## 2020-01-21 ENCOUNTER — ANCILLARY PROCEDURE (OUTPATIENT)
Dept: GENERAL RADIOLOGY | Facility: CLINIC | Age: 55
End: 2020-01-21
Attending: INTERNAL MEDICINE
Payer: COMMERCIAL

## 2020-01-21 VITALS
RESPIRATION RATE: 11 BRPM | TEMPERATURE: 98.6 F | SYSTOLIC BLOOD PRESSURE: 132 MMHG | WEIGHT: 171.4 LBS | BODY MASS INDEX: 23.22 KG/M2 | DIASTOLIC BLOOD PRESSURE: 74 MMHG | HEIGHT: 72 IN | OXYGEN SATURATION: 98 % | HEART RATE: 116 BPM

## 2020-01-21 DIAGNOSIS — R09.82 POSTNASAL DRIP: ICD-10-CM

## 2020-01-21 DIAGNOSIS — R53.83 OTHER FATIGUE: ICD-10-CM

## 2020-01-21 DIAGNOSIS — R05.9 COUGH: Primary | ICD-10-CM

## 2020-01-21 DIAGNOSIS — Z86.69 HISTORY OF UVEITIS: ICD-10-CM

## 2020-01-21 LAB
BASOPHILS # BLD AUTO: 0 10E9/L (ref 0–0.2)
BASOPHILS NFR BLD AUTO: 0.3 %
CRP SERPL-MCNC: <2.9 MG/L (ref 0–8)
DIFFERENTIAL METHOD BLD: NORMAL
EOSINOPHIL # BLD AUTO: 0.2 10E9/L (ref 0–0.7)
EOSINOPHIL NFR BLD AUTO: 3.1 %
ERYTHROCYTE [DISTWIDTH] IN BLOOD BY AUTOMATED COUNT: 12.3 % (ref 10–15)
ERYTHROCYTE [SEDIMENTATION RATE] IN BLOOD BY WESTERGREN METHOD: 8 MM/H (ref 0–20)
HCT VFR BLD AUTO: 45 % (ref 40–53)
HGB BLD-MCNC: 14.7 G/DL (ref 13.3–17.7)
LYMPHOCYTES # BLD AUTO: 1.7 10E9/L (ref 0.8–5.3)
LYMPHOCYTES NFR BLD AUTO: 27.6 %
MCH RBC QN AUTO: 31.1 PG (ref 26.5–33)
MCHC RBC AUTO-ENTMCNC: 32.7 G/DL (ref 31.5–36.5)
MCV RBC AUTO: 95 FL (ref 78–100)
MONOCYTES # BLD AUTO: 0.5 10E9/L (ref 0–1.3)
MONOCYTES NFR BLD AUTO: 8.8 %
NEUTROPHILS # BLD AUTO: 3.7 10E9/L (ref 1.6–8.3)
NEUTROPHILS NFR BLD AUTO: 60.2 %
PLATELET # BLD AUTO: 254 10E9/L (ref 150–450)
RBC # BLD AUTO: 4.72 10E12/L (ref 4.4–5.9)
WBC # BLD AUTO: 6.2 10E9/L (ref 4–11)

## 2020-01-21 PROCEDURE — 85025 COMPLETE CBC W/AUTO DIFF WBC: CPT | Performed by: INTERNAL MEDICINE

## 2020-01-21 PROCEDURE — 86140 C-REACTIVE PROTEIN: CPT | Performed by: INTERNAL MEDICINE

## 2020-01-21 PROCEDURE — 99214 OFFICE O/P EST MOD 30 MIN: CPT | Performed by: INTERNAL MEDICINE

## 2020-01-21 PROCEDURE — 85652 RBC SED RATE AUTOMATED: CPT | Performed by: INTERNAL MEDICINE

## 2020-01-21 PROCEDURE — 36415 COLL VENOUS BLD VENIPUNCTURE: CPT | Performed by: INTERNAL MEDICINE

## 2020-01-21 PROCEDURE — 71046 X-RAY EXAM CHEST 2 VIEWS: CPT | Mod: FY

## 2020-01-21 ASSESSMENT — MIFFLIN-ST. JEOR: SCORE: 1650.95

## 2020-01-21 NOTE — PROGRESS NOTES
Patient's instructions / PLAN:                                                        Plan:  1. flonase nasal spray   2. Chest  XRay today - [address]   3.  Labs today - [address]           ASSESSMENT & PLAN:                                                      54-year-old man with history of uveitis about 9 years ago treated aggressively with prednisone for 1 year and then chlorambucil.  He did not have eye symptoms at that time, but just upper respiratory infection signs.  He presents today with URI getting better.  Because of the uveitis history he is concerned.    (R05) Cough  (primary encounter diagnosis)  Comment:   Plan: XR Chest 2 Views            (R53.83) Other fatigue  Comment:   Plan: CBC with platelets differential, Erythrocyte         sedimentation rate auto, CRP inflammation            (R09.82) Postnasal drip  Comment:   Plan: as above     (Z86.69) History of uveitis  Comment:   Plan: CBC with platelets differential, Erythrocyte         sedimentation rate auto, CRP inflammation               Chief Complaint:                                                      Cough   History of uveitis    SUBJECTIVE:                                                    History of present illness     Cough  -- he was in [place] Dec 2019. He think he got a cold on Dec 27.   -- he thought he recovered, but he  still has mild dry cough, he feels sob w exertion.  -- he feels he has allergies with runny nose ( clear) but he doesn't have allergies. No itching eyes, nose or throat   -- he feels he can't take a full deep breath  -- no wheezes.       Uveitis   -- dx in 2011  -- took Prednisone for 1 y then chlorambucil   -- used to have WBC in 5000 range Last lab 7.6   -- the first symptoms were URI  And this is the reason he is concerned about this URI  --After the chlorambucil treatment his white blood cells have always been in the range of 2244-1563.  Most recent white blood cells count were 7600.  He does not have history  "of frequent  --He does not have history of frequent infections   --He feels tired  ROS:                                                      ROS: negative for fever, chills,  wheezes, chest pain, shortness of breath, vomiting, abdominal pain, leg swelling pos for cough, as above       OBJECTIVE:                                                    Physical Exam :    Blood pressure 132/74, pulse 116, temperature 98.6  F (37  C), temperature source Oral, resp. rate 11, height 1.83 m (6' 0.03\"), weight 77.7 kg (171 lb 6.4 oz), SpO2 98 %.   NAD, appears comfortable  Skin: no rashes   HEENT: PERRLA, EOMI, pink conjunctiva, anicteric sclerae, bilateral tympanic membranes are clinically normal, oropharynx is mild erythematous   Neck: supple, no JVD, No thyroidmegaly. Lymph nodes nonpalpable cervical and supraclavicular.  Chest: clear to auscultation bilaterally, good respiratory effort  Heart: S1 S2, RRR, no mgr appreciated  Abdomen: soft, not tender, no hepatosplenomegaly or masses appreciated, no abdominal bruit, present bowel sounds  Extremities: no edema,   Neurologic: A, Ox3, no focal signs appreciated    PMHx: reviewed  Past Medical History:   Diagnosis Date     Kidney calculus 2007     Uveitis 2010      PSHx: reviewed  Past Surgical History:   Procedure Laterality Date     CATARACT IOL, RT/LT Bilateral      DAVINCI HERNIORRHAPHY INGUINAL Right 1/26/2018    Procedure: DAVINCI HERNIORRHAPHY INGUINAL;  Robotic assisted right inguinal and femoral hernia repair with mesh ;  Surgeon: Shaheen Kee MD;  Location: [facility] OR        Meds: reviewed  No current outpatient medications on file.       Soc Hx: reviewed  Fam Hx: reviewed          Sophie Ha MD  Internal Medicine       "

## 2020-01-21 NOTE — PATIENT INSTRUCTIONS
Plan:  1. flonase nasal spray   2. Chest  XRay today - [address]   3.  Labs today - [address]

## 2020-04-02 ENCOUNTER — MYC MEDICAL ADVICE (OUTPATIENT)
Dept: INTERNAL MEDICINE | Facility: CLINIC | Age: 55
End: 2020-04-02

## 2020-04-10 ENCOUNTER — TRANSFERRED RECORDS (OUTPATIENT)
Dept: HEALTH INFORMATION MANAGEMENT | Facility: CLINIC | Age: 55
End: 2020-04-10

## 2020-12-27 ENCOUNTER — HEALTH MAINTENANCE LETTER (OUTPATIENT)
Age: 55
End: 2020-12-27

## 2021-03-06 ENCOUNTER — HEALTH MAINTENANCE LETTER (OUTPATIENT)
Age: 56
End: 2021-03-06

## 2021-10-09 ENCOUNTER — HEALTH MAINTENANCE LETTER (OUTPATIENT)
Age: 56
End: 2021-10-09

## 2022-01-24 ENCOUNTER — TRANSFERRED RECORDS (OUTPATIENT)
Dept: HEALTH INFORMATION MANAGEMENT | Facility: CLINIC | Age: 57
End: 2022-01-24
Payer: COMMERCIAL

## 2022-01-24 LAB — TSH SERPL-ACNC: 1.44 UIU/ML (ref 0.35–4.94)

## 2022-03-21 ENCOUNTER — TRANSFERRED RECORDS (OUTPATIENT)
Dept: HEALTH INFORMATION MANAGEMENT | Facility: CLINIC | Age: 57
End: 2022-03-21
Payer: COMMERCIAL

## 2022-03-22 ENCOUNTER — HOSPITAL ENCOUNTER (OUTPATIENT)
Dept: CARDIOLOGY | Facility: CLINIC | Age: 57
Discharge: HOME | End: 2022-03-22
Attending: PSYCHIATRY & NEUROLOGY | Admitting: PSYCHIATRY & NEUROLOGY
Payer: COMMERCIAL

## 2022-03-22 DIAGNOSIS — G45.4 TRANSIENT GLOBAL AMNESIA: ICD-10-CM

## 2022-03-22 LAB
EJECTION FRACTION: NORMAL %
LVEF ECHO: NORMAL

## 2022-03-22 PROCEDURE — 93306 TTE W/DOPPLER COMPLETE: CPT | Mod: 26 | Performed by: INTERNAL MEDICINE

## 2022-03-22 PROCEDURE — 258N000001 HC RX 258: Performed by: PSYCHIATRY & NEUROLOGY

## 2022-03-22 PROCEDURE — 999N000208 ECHOCARDIOGRAM COMPLETE

## 2022-03-22 RX ORDER — ACYCLOVIR 200 MG/1
30 CAPSULE ORAL ONCE
Status: COMPLETED | OUTPATIENT
Start: 2022-03-22 | End: 2022-03-22

## 2022-03-22 RX ADMIN — SODIUM CHLORIDE 30 ML: 9 INJECTION, SOLUTION INTRAMUSCULAR; INTRAVENOUS; SUBCUTANEOUS at 16:10

## 2022-03-23 ENCOUNTER — MEDICAL CORRESPONDENCE (OUTPATIENT)
Dept: HEALTH INFORMATION MANAGEMENT | Facility: CLINIC | Age: 57
End: 2022-03-23
Payer: COMMERCIAL

## 2022-03-24 DIAGNOSIS — G45.4 TRANSIENT GLOBAL AMNESIA: Primary | ICD-10-CM

## 2022-03-24 DIAGNOSIS — R93.1 ABNORMAL ECHOCARDIOGRAM: ICD-10-CM

## 2022-03-26 ENCOUNTER — HEALTH MAINTENANCE LETTER (OUTPATIENT)
Age: 57
End: 2022-03-26

## 2022-03-28 PROBLEM — G45.4 TRANSIENT GLOBAL AMNESIA: Status: ACTIVE | Noted: 2022-03-28

## 2022-03-30 ENCOUNTER — OFFICE VISIT (OUTPATIENT)
Dept: CARDIOLOGY | Facility: CLINIC | Age: 57
End: 2022-03-30
Payer: COMMERCIAL

## 2022-03-30 VITALS
WEIGHT: 172.7 LBS | HEART RATE: 110 BPM | HEIGHT: 72 IN | BODY MASS INDEX: 23.39 KG/M2 | SYSTOLIC BLOOD PRESSURE: 136 MMHG | DIASTOLIC BLOOD PRESSURE: 90 MMHG | OXYGEN SATURATION: 98 %

## 2022-03-30 DIAGNOSIS — R93.1 ABNORMAL ECHOCARDIOGRAM: ICD-10-CM

## 2022-03-30 DIAGNOSIS — G45.4 TRANSIENT GLOBAL AMNESIA: ICD-10-CM

## 2022-03-30 PROCEDURE — 99204 OFFICE O/P NEW MOD 45 MIN: CPT | Performed by: INTERNAL MEDICINE

## 2022-03-30 PROCEDURE — 93000 ELECTROCARDIOGRAM COMPLETE: CPT | Performed by: INTERNAL MEDICINE

## 2022-03-30 RX ORDER — ASPIRIN 81 MG/1
81 TABLET, COATED ORAL DAILY
COMMUNITY
Start: 2022-03-14

## 2022-03-30 NOTE — LETTER
3/30/2022    Sophie Adler MD  [address] [facility] [address]    RE: Kwasi Mcelroy       Dear Colleague,     I had the pleasure of seeing Kwasi Mcelroy in the [facility].    Cardiology Consultation       Assessment & Plan     1.  History of retinal tear follows with ophthalmology  2.  Episode of amnesia  3.  Hyperlipidemia  4.  Incidental finding of PFO with right-to-left bubble shunting.  Possible atrial septal aneurysm.      Recommendations    1.  We reviewed his clinical scenario and diagnostic testing.  I have reviewed his echocardiogram in detail with patient.  He has had no CVA or other typical symptoms of TIA.  As such we have discussed with patient that this is likely an incidental finding and such there is no indication of pursuing closure.    2.  Follow-up with neurology for additional testing.  Cardiology follow-up on an as-needed basis.  Thank you kindly for consult.          Jones Landeros MD        HPI    Patient is a 56 male who presents after referral from neurology for his recent echocardiogram.  The echocardiogram is as noted below.  Briefly this demonstrated preserved LV systolic function with no valvular heart disease.  However there was a hypermobile atrial septum with a positive bubble study with right-to-left shunting.  Due to no TR envelope there is no evidence of pulmonary hypertension.  Indirectly RV size is normal and function is normal as well.  Patient was in his usual state of health while he was visiting his mom in [place].  He states that he was taking a bath he got out and apparently he had no memory of the next 45 minutes.  He states that other people in the house and they stated that he was asking the same question for several minutes however he has no recollection of this.  Clinically this gentleman otherwise has been healthy all his life.  He has never had any major chronic medical issues with the exception of ophthalmology  diagnoses which he is seeing a specialist for.  He reports no syncope PND orthopnea or any cardiac related concerns.  He has had a MRI of his head and this did not demonstrate any evidence of CVA.  He also has had a sleep study the results are still pending.  Here for evaluation.      MRI: 2020   EXAM: MR BRAIN WITHOUT AND WITH IV CONTRAST     COMPARISON: None.     FINDINGS: No evidence of abnormal mass or masslike enhancement involving the   cisternal or intracanalicular segments of the 7th or 8th cranial nerves   bilaterally. A vascular loop is in close proximity to the right 7th and 8th   cranial nerves at the level of the porus acusticus but does not result in mass   effect upon either cranial nerve and is of doubtful clinical significance. No   evidence of abnormal enhancement involving the cochlea or semicircular canals   bilaterally. Slight asymmetric T2/FLAIR signal involving the mastoid segment of   the left facial nerve (for example series 7 images 6-7) without associated   abnormal enhancement is of uncertain clinical significance and may potentially   be artifactual.     Normal morphologic appearance and signal characteristics of the cerebral and   cerebellar parenchyma.     IMPRESSION:   No definite abnormality is identified to suggest a cause for   patient's symptoms.          Echo  There is no thrombus seen in the left ventricle.  The visual ejection fraction is 55-60%.  A contrast injection (Bubble Study) was performed that was moderately positive  for flow across the interatrial septum.  The atrial septum is aneurysmal.  A patent foramen ovale is present.            Primary Care Physician   Sophie Adler      Patient Active Problem List   Diagnosis     Advanced directives, counseling/discussion     Hyperlipidemia LDL goal <160     Transient global amnesia       Past Medical History   I have reviewed this patient's medical history and updated it with pertinent information if needed.    Past Medical History:   Diagnosis Date     Kidney calculus 2007     Transient global amnesia 3/28/2022     Uveitis 2010       Past Surgical History   I have reviewed this patient's surgical history and updated it with pertinent information if needed.  Past Surgical History:   Procedure Laterality Date     CATARACT IOL, RT/LT Bilateral      DAVINCI HERNIORRHAPHY INGUINAL Right 1/26/2018    Procedure: DAVINCI HERNIORRHAPHY INGUINAL;  Robotic assisted right inguinal and femoral hernia repair with mesh ;  Surgeon: Shaheen Kee MD;  Location:  OR       Prior to Admission Medications   Cannot display prior to admission medications because the patient has not been admitted in this contact.     [unfilled]  [unfilled]  Allergies   No Known Allergies    Social History    reports that he has never smoked. He has never used smokeless tobacco. He reports current alcohol use. He reports that he does not use drugs.    Family History   Family History   Problem Relation Age of Onset     Family History Negative Mother      Family History Negative Father      Family History Negative Maternal Grandmother      Diabetes Maternal Grandfather         Adult onset     Hyperlipidemia Maternal Grandfather      Family History Negative Paternal Grandmother      Family History Negative Paternal Grandfather      Breast Cancer Paternal Aunt 62       Review of Systems   The comprehensive 10 point Review of Systems is negative other than noted in the HPI or here.     Physical Exam   Vital Signs with Ranges  BP: ()/()   Arterial Line BP: ()/()   Wt Readings from Last 4 Encounters:   01/21/20 77.7 kg (171 lb 6.4 oz)   12/16/19 78.6 kg (173 lb 4.8 oz)   05/14/18 76.1 kg (167 lb 12.8 oz)   02/12/18 75.8 kg (167 lb)     [unfilled]      Vitals: There were no vitals taken for this visit.    GENERAL: Healthy, alert and no distress  EYES: Eyes grossly normal to inspection.  No discharge or erythema, or obvious scleral/conjunctival  abnormalities.  RESP: No audible wheeze, cough, or visible cyanosis.  No visible retractions or increased work of breathing.    SKIN: Visible skin clear. No significant rash, abnormal pigmentation or lesions.  NEURO: Cranial nerves grossly intact.  Mentation and speech appropriate for age.  PSYCH: Mentation appears normal, affect normal/bright, judgement and insight intact, normal speech and appearance well-groomed.    Thank you for allowing me to participate in the care of your patient.      Sincerely,     Jones Landeros MD     [facility]  cc:   Butch Macias PA-C  [facility]  [address] [place] [address]

## 2022-03-30 NOTE — PROGRESS NOTES
Cardiology Consultation       Assessment & Plan     1.  History of retinal tear follows with ophthalmology  2.  Episode of amnesia  3.  Hyperlipidemia  4.  Incidental finding of PFO with right-to-left bubble shunting.  Possible atrial septal aneurysm.      Recommendations    1.  We reviewed his clinical scenario and diagnostic testing.  I have reviewed his echocardiogram in detail with patient.  He has had no CVA or other typical symptoms of TIA.  As such we have discussed with patient that this is likely an incidental finding and such there is no indication of pursuing closure.    2.  Follow-up with neurology for additional testing.  Cardiology follow-up on an as-needed basis.  Thank you kindly for consult.          Jones Landeros MD        HPI    Patient is a 56 male who presents after referral from neurology for his recent echocardiogram.  The echocardiogram is as noted below.  Briefly this demonstrated preserved LV systolic function with no valvular heart disease.  However there was a hypermobile atrial septum with a positive bubble study with right-to-left shunting.  Due to no TR envelope there is no evidence of pulmonary hypertension.  Indirectly RV size is normal and function is normal as well.  Patient was in his usual state of health while he was visiting his mom in [place].  He states that he was taking a bath he got out and apparently he had no memory of the next 45 minutes.  He states that other people in the house and they stated that he was asking the same question for several minutes however he has no recollection of this.  Clinically this gentleman otherwise has been healthy all his life.  He has never had any major chronic medical issues with the exception of ophthalmology diagnoses which he is seeing a specialist for.  He reports no syncope PND orthopnea or any cardiac related concerns.  He has had a MRI of his head and this did not demonstrate any evidence of CVA.  He also has had a  sleep study the results are still pending.  Here for evaluation.      MRI: 2020   EXAM: MR BRAIN WITHOUT AND WITH IV CONTRAST     COMPARISON: None.     FINDINGS: No evidence of abnormal mass or masslike enhancement involving the   cisternal or intracanalicular segments of the 7th or 8th cranial nerves   bilaterally. A vascular loop is in close proximity to the right 7th and 8th   cranial nerves at the level of the porus acusticus but does not result in mass   effect upon either cranial nerve and is of doubtful clinical significance. No   evidence of abnormal enhancement involving the cochlea or semicircular canals   bilaterally. Slight asymmetric T2/FLAIR signal involving the mastoid segment of   the left facial nerve (for example series 7 images 6-7) without associated   abnormal enhancement is of uncertain clinical significance and may potentially   be artifactual.     Normal morphologic appearance and signal characteristics of the cerebral and   cerebellar parenchyma.     IMPRESSION:   No definite abnormality is identified to suggest a cause for   patient's symptoms.          Echo  There is no thrombus seen in the left ventricle.  The visual ejection fraction is 55-60%.  A contrast injection (Bubble Study) was performed that was moderately positive  for flow across the interatrial septum.  The atrial septum is aneurysmal.  A patent foramen ovale is present.            Primary Care Physician   Sophie Adler      Patient Active Problem List   Diagnosis     Advanced directives, counseling/discussion     Hyperlipidemia LDL goal <160     Transient global amnesia       Past Medical History   I have reviewed this patient's medical history and updated it with pertinent information if needed.   Past Medical History:   Diagnosis Date     Kidney calculus 2007     Transient global amnesia 3/28/2022     Uveitis 2010       Past Surgical History   I have reviewed this patient's surgical history and updated it  with pertinent information if needed.  Past Surgical History:   Procedure Laterality Date     CATARACT IOL, RT/LT Bilateral      DAVINCI HERNIORRHAPHY INGUINAL Right 1/26/2018    Procedure: DAVINCI HERNIORRHAPHY INGUINAL;  Robotic assisted right inguinal and femoral hernia repair with mesh ;  Surgeon: Shaheen Kee MD;  Location: [facility] OR       Prior to Admission Medications   Cannot display prior to admission medications because the patient has not been admitted in this contact.     [unfilled]  [unfilled]  Allergies   No Known Allergies    Social History    reports that he has never smoked. He has never used smokeless tobacco. He reports current alcohol use. He reports that he does not use drugs.    Family History   Family History   Problem Relation Age of Onset     Family History Negative Mother      Family History Negative Father      Family History Negative Maternal Grandmother      Diabetes Maternal Grandfather         Adult onset     Hyperlipidemia Maternal Grandfather      Family History Negative Paternal Grandmother      Family History Negative Paternal Grandfather      Breast Cancer Paternal Aunt 62       Review of Systems   The comprehensive 10 point Review of Systems is negative other than noted in the HPI or here.     Physical Exam   Vital Signs with Ranges  BP: ()/()   Arterial Line BP: ()/()   Wt Readings from Last 4 Encounters:   01/21/20 77.7 kg (171 lb 6.4 oz)   12/16/19 78.6 kg (173 lb 4.8 oz)   05/14/18 76.1 kg (167 lb 12.8 oz)   02/12/18 75.8 kg (167 lb)     [unfilled]      Vitals: There were no vitals taken for this visit.    GENERAL: Healthy, alert and no distress  EYES: Eyes grossly normal to inspection.  No discharge or erythema, or obvious scleral/conjunctival abnormalities.  RESP: No audible wheeze, cough, or visible cyanosis.  No visible retractions or increased work of breathing.    SKIN: Visible skin clear. No significant rash, abnormal pigmentation or lesions.  NEURO:  Cranial nerves grossly intact.  Mentation and speech appropriate for age.  PSYCH: Mentation appears normal, affect normal/bright, judgement and insight intact, normal speech and appearance well-groomed.

## 2022-06-06 ENCOUNTER — TRANSFERRED RECORDS (OUTPATIENT)
Dept: HEALTH INFORMATION MANAGEMENT | Facility: CLINIC | Age: 57
End: 2022-06-06
Payer: COMMERCIAL

## 2022-09-17 ENCOUNTER — HEALTH MAINTENANCE LETTER (OUTPATIENT)
Age: 57
End: 2022-09-17

## 2023-12-16 ENCOUNTER — HEALTH MAINTENANCE LETTER (OUTPATIENT)
Age: 58
End: 2023-12-16

## 2024-06-17 PROBLEM — Z71.89 ADVANCED DIRECTIVES, COUNSELING/DISCUSSION: Status: RESOLVED | Noted: 2019-12-16 | Resolved: 2024-06-17

## (undated) DEVICE — Device

## (undated) DEVICE — SYSTEM CLEARIFY VISUALIZATION 21-345

## (undated) DEVICE — ESU PENCIL W/HOLSTER E2350H

## (undated) DEVICE — DAVINCI S CANNULA SEAL 8.5-13MM 420206

## (undated) DEVICE — GLOVE PROTEXIS POWDER FREE SMT 7.5  2D72PT75X

## (undated) DEVICE — DAVINCI S MONOPOLAR SCISSORS PRECURVED HOT SHEARS 420179

## (undated) DEVICE — ESU GROUND PAD ADULT W/CORD E7507

## (undated) DEVICE — GLOVE PROTEXIS BLUE W/NEU-THERA 8.0  2D73EB80

## (undated) DEVICE — GOWN IMPERVIOUS SPECIALTY XLG/XLONG 32474

## (undated) DEVICE — DAVINCI S NDL DRIVER MEGA SUTURE CUT 420309

## (undated) DEVICE — DAVINCI S NDL DRIVER MEGA 420194

## (undated) DEVICE — BLADE CLIPPER 3M 9670

## (undated) DEVICE — SU DERMABOND MINI DHVM12

## (undated) DEVICE — DAVINCI OBTURATOR 8MM BLADELESS 420023

## (undated) DEVICE — DAVINCI S GRASPER ENDOWRIST PROGRASP 420093

## (undated) DEVICE — ESU ELEC BLADE 2.75" COATED/INSULATED E1455

## (undated) DEVICE — GLOVE PROTEXIS POWDER FREE 8.0 ORTHOPEDIC 2D73ET80

## (undated) DEVICE — SOL WATER IRRIG 1000ML BOTTLE 2F7114

## (undated) DEVICE — PACK SET-UP STD 9102

## (undated) DEVICE — LINEN ORTHO PACK 5446

## (undated) DEVICE — ESU CORD BIPOLAR GREEN 10-4000

## (undated) DEVICE — SU VICRYL 0 CT-2 CR 8X18" J727D

## (undated) DEVICE — LUBRICANT INST ELECTROLUBE EL101

## (undated) DEVICE — ESU CORD MONOPOLAR 10'  E0510

## (undated) DEVICE — PROTECTOR ARM ONE-STEP TRENDELENBURG 40418

## (undated) DEVICE — DAVINCI SI DRAPE ACCESSORY KIT 3-ARM 420290

## (undated) DEVICE — SU VICRYL 4-0 PS-2 18" UND J496H

## (undated) DEVICE — DAVINCI HOT SHEARS TIP COVER  400180

## (undated) DEVICE — CATH TRAY FOLEY COUDE SURESTEP 16FR W/DRN BAG LATEX A304416A

## (undated) DEVICE — LIGHT HANDLE X2

## (undated) DEVICE — PREP CHLORAPREP 26ML TINTED ORANGE  260815

## (undated) DEVICE — EVAC SYSTEM CLEAR FLOW SC082500

## (undated) DEVICE — DECANTER VIAL 2006S

## (undated) DEVICE — SU WND CLOSURE VLOC 90 ABS 3-0 VIOLET 6" CV-23 VLOCM0804

## (undated) RX ORDER — DEXAMETHASONE SODIUM PHOSPHATE 4 MG/ML
INJECTION, SOLUTION INTRA-ARTICULAR; INTRALESIONAL; INTRAMUSCULAR; INTRAVENOUS; SOFT TISSUE
Status: DISPENSED
Start: 2018-01-26

## (undated) RX ORDER — FENTANYL CITRATE 50 UG/ML
INJECTION, SOLUTION INTRAMUSCULAR; INTRAVENOUS
Status: DISPENSED
Start: 2018-01-26

## (undated) RX ORDER — GLYCOPYRROLATE 0.2 MG/ML
INJECTION INTRAMUSCULAR; INTRAVENOUS
Status: DISPENSED
Start: 2018-01-26

## (undated) RX ORDER — NEOSTIGMINE METHYLSULFATE 1 MG/ML
VIAL (ML) INJECTION
Status: DISPENSED
Start: 2018-01-26

## (undated) RX ORDER — BUPIVACAINE HYDROCHLORIDE AND EPINEPHRINE 5; 5 MG/ML; UG/ML
INJECTION, SOLUTION EPIDURAL; INTRACAUDAL; PERINEURAL
Status: DISPENSED
Start: 2018-01-26

## (undated) RX ORDER — HYDROMORPHONE HYDROCHLORIDE 1 MG/ML
INJECTION, SOLUTION INTRAMUSCULAR; INTRAVENOUS; SUBCUTANEOUS
Status: DISPENSED
Start: 2018-01-26

## (undated) RX ORDER — HYDROCODONE BITARTRATE AND ACETAMINOPHEN 5; 325 MG/1; MG/1
TABLET ORAL
Status: DISPENSED
Start: 2018-01-26

## (undated) RX ORDER — PROPOFOL 10 MG/ML
INJECTION, EMULSION INTRAVENOUS
Status: DISPENSED
Start: 2018-01-26

## (undated) RX ORDER — ONDANSETRON 2 MG/ML
INJECTION INTRAMUSCULAR; INTRAVENOUS
Status: DISPENSED
Start: 2018-01-26

## (undated) RX ORDER — LIDOCAINE HYDROCHLORIDE 10 MG/ML
INJECTION, SOLUTION EPIDURAL; INFILTRATION; INTRACAUDAL; PERINEURAL
Status: DISPENSED
Start: 2018-01-26

## (undated) RX ORDER — CEFAZOLIN SODIUM 2 G/100ML
INJECTION, SOLUTION INTRAVENOUS
Status: DISPENSED
Start: 2018-01-26